# Patient Record
Sex: FEMALE | Race: BLACK OR AFRICAN AMERICAN
[De-identification: names, ages, dates, MRNs, and addresses within clinical notes are randomized per-mention and may not be internally consistent; named-entity substitution may affect disease eponyms.]

---

## 2020-06-03 ENCOUNTER — HOSPITAL ENCOUNTER (OUTPATIENT)
Dept: HOSPITAL 53 - M LAB REF | Age: 35
End: 2020-06-03
Attending: NURSE PRACTITIONER
Payer: COMMERCIAL

## 2020-06-03 DIAGNOSIS — L60.3: Primary | ICD-10-CM

## 2020-06-09 ENCOUNTER — HOSPITAL ENCOUNTER (OUTPATIENT)
Dept: HOSPITAL 53 - M LAB REF | Age: 35
End: 2020-06-09
Attending: NURSE PRACTITIONER
Payer: COMMERCIAL

## 2020-06-09 DIAGNOSIS — L60.3: Primary | ICD-10-CM

## 2020-06-22 ENCOUNTER — HOSPITAL ENCOUNTER (OUTPATIENT)
Dept: HOSPITAL 53 - M RAD | Age: 35
End: 2020-06-22
Attending: NURSE PRACTITIONER
Payer: COMMERCIAL

## 2020-06-22 DIAGNOSIS — E22.1: Primary | ICD-10-CM

## 2020-07-06 ENCOUNTER — HOSPITAL ENCOUNTER (OUTPATIENT)
Dept: HOSPITAL 53 - M RAD | Age: 35
End: 2020-07-06
Attending: NURSE PRACTITIONER
Payer: COMMERCIAL

## 2020-07-06 DIAGNOSIS — E22.1: Primary | ICD-10-CM

## 2020-07-06 DIAGNOSIS — D35.2: ICD-10-CM

## 2020-07-06 PROCEDURE — 70553 MRI BRAIN STEM W/O & W/DYE: CPT

## 2020-07-06 NOTE — REP
MRI BRAIN/PITUITARY GLAND:

 

INDICATION: Hyperprolactinemia.

 

TECHNIQUE: Multiplanar short and long TR sequences of the brain and sella were

performed including IV gadolinium imaging.

 

COMPARISON:  06/29/2018

 

FINDINGS:

The microadenoma within the left pituitary gland is stable.  There are no new

areas of abnormal enhancement within the sella or suprasellar region.  The

pituitary infundibulum remains slightly deviated to the left.  There are no areas

of pathologic gadolinium enhancement within the brain stem or brain parenchyma.

There are no areas of restricted diffusion.  There is no intracranial mass effect

or hydrocephalus.  No areas of abnormal signals are present within the brain stem

or brain parenchyma.  The large intracranial flow void is unremarkable.

 

IMPRESSION:

1.  Stable pituitary microadenoma.

2.  No acute intracranial process.

 

 

 

Unreviewed

## 2021-02-05 ENCOUNTER — HOSPITAL ENCOUNTER (OUTPATIENT)
Dept: HOSPITAL 53 - M LAB | Age: 36
End: 2021-02-05
Attending: DERMATOLOGY
Payer: COMMERCIAL

## 2021-02-05 DIAGNOSIS — L60.3: Primary | ICD-10-CM

## 2021-02-05 LAB
HBV CORE IGM SER QL: NEGATIVE
HBV SURFACE AB SER QL: POSITIVE
HBV SURFACE AB SER-ACNC: NEGATIVE M[IU]/ML
HCV AB SER QL: < 0 INDEX (ref ?–0.8)
HEPATITIS A ANTIBODY IGM: NEGATIVE
RHEUMATOID FACT SERPL-ACNC: < 10 IU/ML (ref ?–15)

## 2021-11-10 ENCOUNTER — OFFICE VISIT (OUTPATIENT)
Dept: UROLOGY | Age: 36
End: 2021-11-10
Payer: OTHER GOVERNMENT

## 2021-11-10 VITALS
BODY MASS INDEX: 30.01 KG/M2 | TEMPERATURE: 97.5 F | HEIGHT: 68 IN | HEART RATE: 78 BPM | OXYGEN SATURATION: 99 % | WEIGHT: 198 LBS | DIASTOLIC BLOOD PRESSURE: 84 MMHG | SYSTOLIC BLOOD PRESSURE: 119 MMHG

## 2021-11-10 DIAGNOSIS — N18.2 CKD (CHRONIC KIDNEY DISEASE) STAGE 2, GFR 60-89 ML/MIN: Primary | ICD-10-CM

## 2021-11-10 DIAGNOSIS — N28.89 PELVIECTASIS: ICD-10-CM

## 2021-11-10 LAB
BILIRUB UR QL STRIP: NEGATIVE
GLUCOSE UR-MCNC: NEGATIVE MG/DL
KETONES P FAST UR STRIP-MCNC: NEGATIVE MG/DL
PH UR STRIP: 7.5 [PH] (ref 4.6–8)
PROT UR QL STRIP: NEGATIVE
SP GR UR STRIP: 1.01 (ref 1–1.03)
UA UROBILINOGEN AMB POC: NORMAL (ref 0.2–1)
URINALYSIS CLARITY POC: CLEAR
URINALYSIS COLOR POC: YELLOW
URINE BLOOD POC: NEGATIVE
URINE LEUKOCYTES POC: NEGATIVE
URINE NITRITES POC: NEGATIVE

## 2021-11-10 PROCEDURE — 99204 OFFICE O/P NEW MOD 45 MIN: CPT | Performed by: UROLOGY

## 2021-11-10 PROCEDURE — 81003 URINALYSIS AUTO W/O SCOPE: CPT | Performed by: UROLOGY

## 2021-11-10 RX ORDER — CLOBETASOL PROPIONATE 0.5 MG/G
OINTMENT TOPICAL 2 TIMES DAILY
COMMUNITY

## 2021-11-10 RX ORDER — KETOCONAZOLE 20 MG/G
CREAM TOPICAL DAILY
COMMUNITY

## 2021-11-10 RX ORDER — BACLOFEN 10 MG/1
TABLET ORAL 3 TIMES DAILY
COMMUNITY
End: 2022-10-05

## 2021-11-10 RX ORDER — DICLOFENAC SODIUM 10 MG/G
GEL TOPICAL 4 TIMES DAILY
COMMUNITY

## 2021-11-10 RX ORDER — KETOCONAZOLE 20 MG/ML
SHAMPOO TOPICAL DAILY PRN
COMMUNITY

## 2021-11-10 RX ORDER — DIPHENHYDRAMINE HCL 25 MG
25 CAPSULE ORAL
COMMUNITY

## 2021-11-10 RX ORDER — TACROLIMUS 1 MG/G
OINTMENT TOPICAL 2 TIMES DAILY
COMMUNITY

## 2021-11-10 RX ORDER — LEVOTHYROXINE SODIUM 88 UG/1
TABLET ORAL
COMMUNITY

## 2021-11-10 RX ORDER — SUMATRIPTAN 100 MG/1
100 TABLET, FILM COATED ORAL
COMMUNITY

## 2021-11-10 RX ORDER — FLUOCINOLONE ACETONIDE 0.11 MG/ML
OIL TOPICAL
COMMUNITY

## 2021-11-10 RX ORDER — TOPIRAMATE 200 MG/1
TABLET ORAL 2 TIMES DAILY
COMMUNITY

## 2021-11-10 RX ORDER — CABERGOLINE 0.5 MG/1
0.5 TABLET ORAL 2 TIMES WEEKLY
COMMUNITY
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
## 2021-11-10 NOTE — PROGRESS NOTES
Chief Complaint   Patient presents with    New Patient     renal dysfunction      1. Have you been to the ER, urgent care clinic since your last visit? Hospitalized since your last visit? No    2. Have you seen or consulted any other health care providers outside of the [address] [place] [address] since your last visit? Include any pap smears or colon screening.  No  Visit Vitals  /84 (BP 1 Location: Left upper arm, BP Patient Position: Sitting, BP Cuff Size: Adult)   Pulse 78   Temp 97.5 °F (36.4 °C) (Temporal)   Ht 5' 8\" (1.727 m)   Wt 198 lb (89.8 kg)   SpO2 99%   BMI 30.11 kg/m²

## 2021-11-10 NOTE — ASSESSMENT & PLAN NOTE
Mild elevation in Cr. She was on a keto diet at the time. I will recheck her labs. She was advised on hydration. She should avoid NSAIDs. She will minimize caffeine intake.

## 2021-11-11 ENCOUNTER — PATIENT MESSAGE (OUTPATIENT)
Dept: UROLOGY | Age: 36
End: 2021-11-11

## 2021-11-11 LAB
BUN SERPL-MCNC: 9 MG/DL (ref 6–20)
BUN/CREAT SERPL: 10 (ref 9–23)
CALCIUM SERPL-MCNC: 9.5 MG/DL (ref 8.7–10.2)
CHLORIDE SERPL-SCNC: 105 MMOL/L (ref 96–106)
CO2 SERPL-SCNC: 23 MMOL/L (ref 20–29)
CREAT SERPL-MCNC: 0.87 MG/DL (ref 0.57–1)
GLUCOSE SERPL-MCNC: 86 MG/DL (ref 65–99)
POTASSIUM SERPL-SCNC: 4.3 MMOL/L (ref 3.5–5.2)
SODIUM SERPL-SCNC: 142 MMOL/L (ref 134–144)

## 2021-11-15 NOTE — TELEPHONE ENCOUNTER
Documentation in Dr. [facility] note was corrected. That is a smartlink so at some point in time the alcohol box was marked as positive. It has been fixed on all ends.

## 2021-11-16 ENCOUNTER — HOSPITAL ENCOUNTER (OUTPATIENT)
Dept: CT IMAGING | Age: 36
Discharge: HOME | End: 2021-11-16
Attending: UROLOGY
Payer: OTHER GOVERNMENT

## 2021-11-16 DIAGNOSIS — N18.2 CKD (CHRONIC KIDNEY DISEASE) STAGE 2, GFR 60-89 ML/MIN: ICD-10-CM

## 2021-11-16 DIAGNOSIS — N28.89 PELVIECTASIS: ICD-10-CM

## 2021-11-16 PROCEDURE — 74011000636 HC RX REV CODE- 636: Performed by: UROLOGY

## 2021-11-16 PROCEDURE — 74178 CT ABD&PLV WO CNTR FLWD CNTR: CPT

## 2021-11-16 RX ADMIN — IOPAMIDOL 100 ML: 755 INJECTION, SOLUTION INTRAVENOUS at 17:35

## 2021-11-17 ENCOUNTER — HOSPITAL ENCOUNTER (EMERGENCY)
Age: 36
Discharge: HOME | End: 2021-11-18
Attending: STUDENT IN AN ORGANIZED HEALTH CARE EDUCATION/TRAINING PROGRAM
Payer: OTHER GOVERNMENT

## 2021-11-17 ENCOUNTER — APPOINTMENT (OUTPATIENT)
Dept: GENERAL RADIOLOGY | Age: 36
End: 2021-11-17
Attending: EMERGENCY MEDICINE
Payer: OTHER GOVERNMENT

## 2021-11-17 DIAGNOSIS — R07.89 ATYPICAL CHEST PAIN: Primary | ICD-10-CM

## 2021-11-17 LAB
BASOPHILS # BLD: 0 K/UL (ref 0–0.1)
BASOPHILS NFR BLD: 0 % (ref 0–1)
DIFFERENTIAL METHOD BLD: ABNORMAL
EOSINOPHIL # BLD: 0.1 K/UL (ref 0–0.4)
EOSINOPHIL NFR BLD: 1 % (ref 0–7)
ERYTHROCYTE [DISTWIDTH] IN BLOOD BY AUTOMATED COUNT: 13.3 % (ref 11.5–14.5)
HCT VFR BLD AUTO: 34.3 % (ref 35–47)
HGB BLD-MCNC: 11 G/DL (ref 11.5–16)
IMM GRANULOCYTES # BLD AUTO: 0 K/UL (ref 0–0.04)
IMM GRANULOCYTES NFR BLD AUTO: 0 % (ref 0–0.5)
LYMPHOCYTES # BLD: 2.1 K/UL (ref 0.8–3.5)
LYMPHOCYTES NFR BLD: 34 % (ref 12–49)
MCH RBC QN AUTO: 27.8 PG (ref 26–34)
MCHC RBC AUTO-ENTMCNC: 32.1 G/DL (ref 30–36.5)
MCV RBC AUTO: 86.6 FL (ref 80–99)
MONOCYTES # BLD: 0.5 K/UL (ref 0–1)
MONOCYTES NFR BLD: 8 % (ref 5–13)
NEUTS SEG # BLD: 3.4 K/UL (ref 1.8–8)
NEUTS SEG NFR BLD: 57 % (ref 32–75)
NRBC # BLD: 0 K/UL (ref 0–0.01)
NRBC BLD-RTO: 0 PER 100 WBC
PLATELET # BLD AUTO: 312 K/UL (ref 150–400)
PMV BLD AUTO: 10.5 FL (ref 8.9–12.9)
RBC # BLD AUTO: 3.96 M/UL (ref 3.8–5.2)
TROPONIN-HIGH SENSITIVITY: 4 NG/L (ref 0–51)
WBC # BLD AUTO: 6.1 K/UL (ref 3.6–11)

## 2021-11-17 PROCEDURE — 84484 ASSAY OF TROPONIN QUANT: CPT

## 2021-11-17 PROCEDURE — 36415 COLL VENOUS BLD VENIPUNCTURE: CPT

## 2021-11-17 PROCEDURE — 93005 ELECTROCARDIOGRAM TRACING: CPT

## 2021-11-17 PROCEDURE — 85025 COMPLETE CBC W/AUTO DIFF WBC: CPT

## 2021-11-17 PROCEDURE — 71045 X-RAY EXAM CHEST 1 VIEW: CPT

## 2021-11-17 PROCEDURE — 84702 CHORIONIC GONADOTROPIN TEST: CPT

## 2021-11-17 PROCEDURE — 80053 COMPREHEN METABOLIC PANEL: CPT

## 2021-11-17 PROCEDURE — 99284 EMERGENCY DEPT VISIT MOD MDM: CPT

## 2021-11-18 VITALS
DIASTOLIC BLOOD PRESSURE: 82 MMHG | WEIGHT: 198 LBS | HEART RATE: 88 BPM | RESPIRATION RATE: 16 BRPM | HEIGHT: 68 IN | TEMPERATURE: 98.3 F | OXYGEN SATURATION: 98 % | SYSTOLIC BLOOD PRESSURE: 114 MMHG | BODY MASS INDEX: 30.01 KG/M2

## 2021-11-18 LAB
ALBUMIN SERPL-MCNC: 3.6 G/DL (ref 3.5–5)
ALBUMIN/GLOB SERPL: 0.8 {RATIO} (ref 1.1–2.2)
ALP SERPL-CCNC: 72 U/L (ref 45–117)
ALT SERPL-CCNC: 15 U/L (ref 12–78)
ANION GAP SERPL CALC-SCNC: 6 MMOL/L (ref 5–15)
AST SERPL W P-5'-P-CCNC: 35 U/L (ref 15–37)
ATRIAL RATE: 100 BPM
BILIRUB SERPL-MCNC: 0.2 MG/DL (ref 0.2–1)
BUN SERPL-MCNC: 15 MG/DL (ref 6–20)
BUN/CREAT SERPL: 17 (ref 12–20)
CA-I BLD-MCNC: 9 MG/DL (ref 8.5–10.1)
CALCULATED P AXIS, ECG09: 64 DEGREES
CALCULATED R AXIS, ECG10: 49 DEGREES
CALCULATED T AXIS, ECG11: 32 DEGREES
CHLORIDE SERPL-SCNC: 108 MMOL/L (ref 97–108)
CO2 SERPL-SCNC: 24 MMOL/L (ref 21–32)
CREAT SERPL-MCNC: 0.9 MG/DL (ref 0.55–1.02)
DIAGNOSIS, 93000: NORMAL
GLOBULIN SER CALC-MCNC: 4.5 G/DL (ref 2–4)
GLUCOSE SERPL-MCNC: 88 MG/DL (ref 65–100)
HCG SERPL-ACNC: <1 MIU/ML (ref 0–6)
P-R INTERVAL, ECG05: 142 MS
POTASSIUM SERPL-SCNC: 5 MMOL/L (ref 3.5–5.1)
PROT SERPL-MCNC: 8.1 G/DL (ref 6.4–8.2)
Q-T INTERVAL, ECG07: 344 MS
QRS DURATION, ECG06: 70 MS
QTC CALCULATION (BEZET), ECG08: 443 MS
SODIUM SERPL-SCNC: 138 MMOL/L (ref 136–145)
VENTRICULAR RATE, ECG03: 100 BPM

## 2021-11-18 PROCEDURE — 74011250637 HC RX REV CODE- 250/637: Performed by: STUDENT IN AN ORGANIZED HEALTH CARE EDUCATION/TRAINING PROGRAM

## 2021-11-18 RX ORDER — METHOCARBAMOL 750 MG/1
1500 TABLET, FILM COATED ORAL ONCE
Status: COMPLETED | OUTPATIENT
Start: 2021-11-18 | End: 2021-11-18

## 2021-11-18 RX ORDER — IBUPROFEN 600 MG/1
600 TABLET ORAL
Qty: 20 TABLET | Refills: 0 | Status: SHIPPED | OUTPATIENT
Start: 2021-11-18

## 2021-11-18 RX ORDER — IBUPROFEN 600 MG/1
600 TABLET ORAL
Status: DISCONTINUED | OUTPATIENT
Start: 2021-11-18 | End: 2021-11-18 | Stop reason: HOSPADM

## 2021-11-18 RX ORDER — METHOCARBAMOL 750 MG/1
750 TABLET, FILM COATED ORAL
Qty: 20 TABLET | Refills: 0 | Status: SHIPPED | OUTPATIENT
Start: 2021-11-18 | End: 2022-10-05 | Stop reason: ALTCHOICE

## 2021-11-18 RX ORDER — ACETAMINOPHEN 325 MG/1
650 TABLET ORAL
Qty: 20 TABLET | Refills: 0 | Status: SHIPPED | OUTPATIENT
Start: 2021-11-18

## 2021-11-18 RX ORDER — FAMOTIDINE 20 MG/1
20 TABLET, FILM COATED ORAL
Status: COMPLETED | OUTPATIENT
Start: 2021-11-18 | End: 2021-11-18

## 2021-11-18 RX ADMIN — FAMOTIDINE 20 MG: 20 TABLET, FILM COATED ORAL at 00:19

## 2021-11-18 RX ADMIN — METHOCARBAMOL 1500 MG: 750 TABLET ORAL at 00:19

## 2021-11-18 NOTE — ED PROVIDER NOTES
EMERGENCY DEPARTMENT HISTORY AND PHYSICAL EXAM      Date: 11/17/2021  Patient Name: Galen Butler    History of Presenting Illness     Chief Complaint   Patient presents with    Chest Pain       HPI: Galen Butler, 39 y.o. female with a past medical history of hyperlipidemia presenting for chest pain and shortness of breath. Patient has had a 1 month history of intermittent chest pains that are described as pressure-like and stabbing-like alternatingly in the mid chest area without radiation. She also has shortness of breath that is intermittently associated with  the chest pain and sometimes occurs at rest. Symptoms are not particularly only worsened by exertion and exertional dyspnea is inconsistent. She denies any fevers, chills, cough, hemoptysis, lower extremity swelling, orthopnea, OCP use, or history of DVT/PE. She has been evaluated by cardiology for the chest pain and tests were negative. Patient has had these symptoms before. She is presented today for nonresolution of symptoms. PCP: None    Current Facility-Administered Medications   Medication Dose Route Frequency Provider Last Rate Last Admin    famotidine (PEPCID) tablet 20 mg  20 mg Oral NOW Luca Blake MD        methocarbamoL (ROBAXIN) tablet 1,500 mg  1,500 mg Oral ONCE Luca Blake MD        ibuprofen (MOTRIN) tablet 600 mg  600 mg Oral NOW Luca Leal MD         Current Outpatient Medications   Medication Sig Dispense Refill    acetaminophen (TYLENOL) 325 mg tablet Take 2 Tablets by mouth every six (6) hours as needed for Pain. 20 Tablet 0    ibuprofen (MOTRIN) 600 mg tablet Take 1 Tablet by mouth three (3) times daily as needed for Pain. 20 Tablet 0    methocarbamoL (Robaxin-750) 750 mg tablet Take 1 Tablet by mouth three (3) times daily as needed for Muscle Spasm(s). 20 Tablet 0    diclofenac (VOLTAREN) 1 % gel Apply  to affected area four (4) times daily.       topiramate (TOPAMAX) 200 mg tablet Take  by mouth two (2) times a day.      levothyroxine (SYNTHROID) 88 mcg tablet Take  by mouth Daily (before breakfast).  fluocinolone 0.01 % external oil Apply  to affected area.  clobetasoL (TEMOVATE) 0.05 % ointment Apply  to affected area two (2) times a day.  diphenhydrAMINE (BENADRYL) 25 mg capsule Take 25 mg by mouth every six (6) hours as needed.  SUMAtriptan (IMITREX) 100 mg tablet Take 100 mg by mouth once as needed for Migraine.  cabergoline (DOSTINEX) 0.5 mg tablet Take 0.5 mg by mouth two (2) times a week.  ketoconazole (NIZORAL) 2 % topical cream Apply  to affected area daily.  ketoconazole (NIZORAL) 2 % shampoo Apply  to affected area daily as needed for Itching.  tacrolimus (PROTOPIC) 0.1 % ointment Apply  to affected area two (2) times a day.  baclofen (LIORESAL) 10 mg tablet Take  by mouth three (3) times daily.  selenium sulfide (TERSI) 2.25 % topical foam Apply  to affected area daily.  onabotulinumtoxinA (BOTOX) 100 unit injection 100 Units by IntraMUSCular route once. She gets it every 3 months in the head and shoulders         Medical History   I reviewed the medical, surgical, family, and social history, as well as allergies:    Past Medical History:  Past Medical History:   Diagnosis Date    Hypercholesterolemia        Past Surgical History:  Past Surgical History:   Procedure Laterality Date    HX ORTHOPAEDIC      left elbow    HX SHOULDER ARTHROSCOPY Right        Family History:  Family History   Problem Relation Age of Onset    Cancer Maternal Grandmother     Cancer Maternal Grandfather        Social History:  Social History     Tobacco Use    Smoking status: Never Smoker    Smokeless tobacco: Never Used   Vaping Use    Vaping Use: Never used   Substance Use Topics    Alcohol use: Not Currently    Drug use: Never       Allergies:   Allergies   Allergen Reactions    Adhesive Unknown (comments)    Egg Unknown (comments)       Review of Systems Review of Systems   All other systems reviewed and are negative. Physical Exam and Vital Signs   Vital Signs - Reviewed the patient's vital signs. Patient Vitals for the past 12 hrs:   Temp Pulse Resp BP SpO2   11/17/21 2056 98.3 °F (36.8 °C) (!) 101 18 118/80 99 %       Physical Exam:    GENERAL: awake, alert, cooperative, not in distress  HEENT:  * Pupils equal, EOMI  * Head atraumatic  CV:  * regular rhythm  * warm and perfused extremities bilaterally  PULMONARY: Good air movement, no wheezes or crackles  ABDOMEN: soft, not distended, no guarding, not tenderness to palpation  : No suprapubic tenderness  EXTREMITIES/BACK: warm and perfused, no tenderness, no edema  SKIN: no rashes or signs of trauma  NEURO:  * Speech clear  * Moves U&LE to command      Medical Decision Making and ED Course   - I am the first and primary provider for this patient and am the primary provider of record. - I reviewed the vital signs, available nursing notes, past medical history, past surgical history, family history and social history. - Initial assessment performed. The patients presenting problems have been discussed, and the staff are in agreement with the care plan formulated and outlined with them. I have encouraged them to ask questions as they arise throughout their visit. - Available medical records, nursing notes, old EKGs, and EMS run sheets (if patient was EMS transported) were reviewed    MDM:   Patient is a 39 y.o. female presenting for chest pain. Vitals reveal a heart rate of 101 and physical exam reveals no abnormalities. EKG showed no abnormalities. Based on the history, physical exam, risk factors, and vitals signs, I favor the following differential diagnoses: ACS, CHF, arrhythmia, valvulopathy, pericarditis, effusion, pneumothorax, pneumonia, GERD, esophageal spasm, musculoskeletal pain. PE is unlikely as the patient has a 0 Wells score, will not pursue.       Results     Labs:  Recent Results (from the past 12 hour(s))   CBC WITH AUTOMATED DIFF    Collection Time: 11/17/21 11:24 PM   Result Value Ref Range    WBC 6.1 3.6 - 11.0 K/uL    RBC 3.96 3.80 - 5.20 M/uL    HGB 11.0 (L) 11.5 - 16.0 g/dL    HCT 34.3 (L) 35.0 - 47.0 %    MCV 86.6 80.0 - 99.0 FL    MCH 27.8 26.0 - 34.0 PG    MCHC 32.1 30.0 - 36.5 g/dL    RDW 13.3 11.5 - 14.5 %    PLATELET 407 435 - 572 K/uL    MPV 10.5 8.9 - 12.9 FL    NRBC 0.0 0.0  WBC    ABSOLUTE NRBC 0.00 0.00 - 0.01 K/uL    NEUTROPHILS 57 32 - 75 %    LYMPHOCYTES 34 12 - 49 %    MONOCYTES 8 5 - 13 %    EOSINOPHILS 1 0 - 7 %    BASOPHILS 0 0 - 1 %    IMMATURE GRANULOCYTES 0 0 - 0.5 %    ABS. NEUTROPHILS 3.4 1.8 - 8.0 K/UL    ABS. LYMPHOCYTES 2.1 0.8 - 3.5 K/UL    ABS. MONOCYTES 0.5 0.0 - 1.0 K/UL    ABS. EOSINOPHILS 0.1 0.0 - 0.4 K/UL    ABS. BASOPHILS 0.0 0.0 - 0.1 K/UL    ABS. IMM. GRANS. 0.0 0.00 - 0.04 K/UL    DF AUTOMATED     METABOLIC PANEL, COMPREHENSIVE    Collection Time: 11/17/21 11:24 PM   Result Value Ref Range    Sodium 138 136 - 145 mmol/L    Potassium 5.0 3.5 - 5.1 mmol/L    Chloride 108 97 - 108 mmol/L    CO2 24 21 - 32 mmol/L    Anion gap 6 5 - 15 mmol/L    Glucose 88 65 - 100 mg/dL    BUN 15 6 - 20 mg/dL    Creatinine 0.90 0.55 - 1.02 mg/dL    BUN/Creatinine ratio 17 12 - 20      GFR est AA >60 >60 ml/min/1.73m2    GFR est non-AA >60 >60 ml/min/1.73m2    Calcium 9.0 8.5 - 10.1 mg/dL    Bilirubin, total 0.2 0.2 - 1.0 mg/dL    AST (SGOT) 35 15 - 37 U/L    ALT (SGPT) 15 12 - 78 U/L    Alk.  phosphatase 72 45 - 117 U/L    Protein, total 8.1 6.4 - 8.2 g/dL    Albumin 3.6 3.5 - 5.0 g/dL    Globulin 4.5 (H) 2.0 - 4.0 g/dL    A-G Ratio 0.8 (L) 1.1 - 2.2     TROPONIN-HIGH SENSITIVITY    Collection Time: 11/17/21 11:24 PM   Result Value Ref Range    Troponin-High Sensitivity 4 0 - 51 ng/L   BETA HCG, QT    Collection Time: 11/17/21 11:24 PM   Result Value Ref Range    Beta HCG, QT <1.0 0 - 6 mIU/mL       Radiologic Studies:  CT Results  (Last 48 hours)    None CXR Results  (Last 48 hours)               11/17/21 2112  XR CHEST PORT Final result    Impression:  No acute cardiopulmonary process. Narrative:  Exam: XR CHEST PORT         Indication:  chest pain/ SOB; Comparison: None       Findings: The cardiomediastinal silhouette is within normal limits. No focal parenchymal   process. No pleural effusion. No pneumothorax. No acute osseous abnormality. Medications ordered:  Medications   famotidine (PEPCID) tablet 20 mg (has no administration in time range)   methocarbamoL (ROBAXIN) tablet 1,500 mg (has no administration in time range)   ibuprofen (MOTRIN) tablet 600 mg (has no administration in time range)        ED Course     ED Course:     ED Course as of 11/18/21 0008   Wed Nov 17, 2021   2307 Chest x-ray negative, no concern for pulmonary edema, pleural effusion, pneumothorax, or pneumonia. [SS]   Thu Nov 18, 2021   0004 Trop is 4, ACS ruled out. [SS]   0004 CBC does not show any evidence of acute process. Leukocytosis not present to suggest infection. Hemoglobin at baseline without evidence of acute anemia. Platelet count is normal. [SS]   0004 Electrolytes are within range. Creatinine is not elevated more than baseline range making NE unlikely. No significant transaminitis noted. Normal bilirubin. BHCG testing rules out pregnancy. [SS]      ED Course User Index  [SS] Lyndsay Balderrama MD       Reassessment / Disposition / Discussion:    Patient has negative work-up. Given the prolonged symptomatology and cardiac evaluation, cardiac causes unlikely. Repeat examination is normal.  EKG is normal.  No concern for ACS. Will discharge with follow-up and return precautions. Final Disposition     Disposition: Condition stable  DC- Adult Discharges: All of the diagnostic tests were reviewed and questions answered. Diagnosis, care plan and treatment options were discussed.   The patient understands the instructions and will follow up as directed. The patients results have been reviewed with them. They have been counseled regarding their diagnosis. The patient verbally convey understanding and agreement of the signs, symptoms, diagnosis, treatment and prognosis and additionally agrees to follow up as recommended with their PCP in 24 - 48 hours. They also agree with the care-plan and convey that all of their questions have been answered. I have also put together some discharge instructions for them that include: 1) educational information regarding their diagnosis, 2) how to care for their diagnosis at home, as well a 3) list of reasons why they would want to return to the ED prior to their follow-up appointment, should their condition change. DISCHARGE PLAN:  1. Current Discharge Medication List      START taking these medications    Details   acetaminophen (TYLENOL) 325 mg tablet Take 2 Tablets by mouth every six (6) hours as needed for Pain. Qty: 20 Tablet, Refills: 0      ibuprofen (MOTRIN) 600 mg tablet Take 1 Tablet by mouth three (3) times daily as needed for Pain. Qty: 20 Tablet, Refills: 0      methocarbamoL (Robaxin-750) 750 mg tablet Take 1 Tablet by mouth three (3) times daily as needed for Muscle Spasm(s). Qty: 20 Tablet, Refills: 0         CONTINUE these medications which have NOT CHANGED    Details   diclofenac (VOLTAREN) 1 % gel Apply  to affected area four (4) times daily. topiramate (TOPAMAX) 200 mg tablet Take  by mouth two (2) times a day. levothyroxine (SYNTHROID) 88 mcg tablet Take  by mouth Daily (before breakfast). fluocinolone 0.01 % external oil Apply  to affected area. clobetasoL (TEMOVATE) 0.05 % ointment Apply  to affected area two (2) times a day. diphenhydrAMINE (BENADRYL) 25 mg capsule Take 25 mg by mouth every six (6) hours as needed. SUMAtriptan (IMITREX) 100 mg tablet Take 100 mg by mouth once as needed for Migraine.       cabergoline (DOSTINEX) 0.5 mg tablet Take 0.5 mg by mouth two (2) times a week.      ketoconazole (NIZORAL) 2 % topical cream Apply  to affected area daily. ketoconazole (NIZORAL) 2 % shampoo Apply  to affected area daily as needed for Itching. tacrolimus (PROTOPIC) 0.1 % ointment Apply  to affected area two (2) times a day. baclofen (LIORESAL) 10 mg tablet Take  by mouth three (3) times daily. selenium sulfide (TERSI) 2.25 % topical foam Apply  to affected area daily. onabotulinumtoxinA (BOTOX) 100 unit injection 100 Units by IntraMUSCular route once. She gets it every 3 months in the head and shoulders           2. Follow-up Information     Follow up With Specialties Details Why [address] EMERGENCY DEPT Emergency Medicine Go to  If symptoms worsen [address] [facility] [address] [phone]    Your doctor  Schedule an appointment as soon as possible for a visit in 3 days          3. Return to ED if worse   4. Current Discharge Medication List      START taking these medications    Details   acetaminophen (TYLENOL) 325 mg tablet Take 2 Tablets by mouth every six (6) hours as needed for Pain. Qty: 20 Tablet, Refills: 0  Start date: 11/18/2021      ibuprofen (MOTRIN) 600 mg tablet Take 1 Tablet by mouth three (3) times daily as needed for Pain. Qty: 20 Tablet, Refills: 0  Start date: 11/18/2021      methocarbamoL (Robaxin-750) 750 mg tablet Take 1 Tablet by mouth three (3) times daily as needed for Muscle Spasm(s). Qty: 20 Tablet, Refills: 0  Start date: 11/18/2021             ED Procedures & Consultations   Performed by: Rani Cabrera MD  Procedures     EKG interpretation (Preliminary):  Rhythm: normal sinus rhythm; and tachycardic . Rate (approx.): 100. Axis: normal;  MS interval: normal;  QRS interval: normal ;  ST/T wave: normal;  Other findings: abnormal EKG: Sinus tachycardia. Diagnosis     Clinical Impression:   1.  Atypical chest pain        Attestations:    Rani Cabrera MD    Please note that this dictation was completed with [employer], the computer voice recognition software. Quite often unanticipated grammatical, syntax, homophones, and other interpretive errors are inadvertently transcribed by the computer software. Please disregard these errors. Please excuse any errors that have escaped final proofreading. Thank you.

## 2021-11-18 NOTE — DISCHARGE INSTRUCTIONS
Thank you! Thank you for allowing me to care for you in the emergency department. I sincerely hope that you are satisfied with your visit today. It is my goal to provide you with excellent care. Below you will find a list of your labs and imaging from your visit today. Should you have any questions regarding these results please do not hesitate to call the emergency department. Labs -     Recent Results (from the past 12 hour(s))   CBC WITH AUTOMATED DIFF    Collection Time: 11/17/21 11:24 PM   Result Value Ref Range    WBC 6.1 3.6 - 11.0 K/uL    RBC 3.96 3.80 - 5.20 M/uL    HGB 11.0 (L) 11.5 - 16.0 g/dL    HCT 34.3 (L) 35.0 - 47.0 %    MCV 86.6 80.0 - 99.0 FL    MCH 27.8 26.0 - 34.0 PG    MCHC 32.1 30.0 - 36.5 g/dL    RDW 13.3 11.5 - 14.5 %    PLATELET 151 711 - 115 K/uL    MPV 10.5 8.9 - 12.9 FL    NRBC 0.0 0.0  WBC    ABSOLUTE NRBC 0.00 0.00 - 0.01 K/uL    NEUTROPHILS 57 32 - 75 %    LYMPHOCYTES 34 12 - 49 %    MONOCYTES 8 5 - 13 %    EOSINOPHILS 1 0 - 7 %    BASOPHILS 0 0 - 1 %    IMMATURE GRANULOCYTES 0 0 - 0.5 %    ABS. NEUTROPHILS 3.4 1.8 - 8.0 K/UL    ABS. LYMPHOCYTES 2.1 0.8 - 3.5 K/UL    ABS. MONOCYTES 0.5 0.0 - 1.0 K/UL    ABS. EOSINOPHILS 0.1 0.0 - 0.4 K/UL    ABS. BASOPHILS 0.0 0.0 - 0.1 K/UL    ABS. IMM. GRANS. 0.0 0.00 - 0.04 K/UL    DF AUTOMATED     METABOLIC PANEL, COMPREHENSIVE    Collection Time: 11/17/21 11:24 PM   Result Value Ref Range    Sodium 138 136 - 145 mmol/L    Potassium 5.0 3.5 - 5.1 mmol/L    Chloride 108 97 - 108 mmol/L    CO2 24 21 - 32 mmol/L    Anion gap 6 5 - 15 mmol/L    Glucose 88 65 - 100 mg/dL    BUN 15 6 - 20 mg/dL    Creatinine 0.90 0.55 - 1.02 mg/dL    BUN/Creatinine ratio 17 12 - 20      GFR est AA >60 >60 ml/min/1.73m2    GFR est non-AA >60 >60 ml/min/1.73m2    Calcium 9.0 8.5 - 10.1 mg/dL    Bilirubin, total 0.2 0.2 - 1.0 mg/dL    AST (SGOT) 35 15 - 37 U/L    ALT (SGPT) 15 12 - 78 U/L    Alk.  phosphatase 72 45 - 117 U/L    Protein, total 8.1 6.4 - 8.2 g/dL    Albumin 3.6 3.5 - 5.0 g/dL    Globulin 4.5 (H) 2.0 - 4.0 g/dL    A-G Ratio 0.8 (L) 1.1 - 2.2     TROPONIN-HIGH SENSITIVITY    Collection Time: 11/17/21 11:24 PM   Result Value Ref Range    Troponin-High Sensitivity 4 0 - 51 ng/L   BETA HCG, QT    Collection Time: 11/17/21 11:24 PM   Result Value Ref Range    Beta HCG, QT <1.0 0 - 6 mIU/mL       Radiologic Studies -   XR CHEST PORT   Final Result   No acute cardiopulmonary process. CT Results  (Last 48 hours)      None          CXR Results  (Last 48 hours)                 11/17/21 2112  XR CHEST PORT Final result    Impression:  No acute cardiopulmonary process. Narrative:  Exam: XR CHEST PORT         Indication:  chest pain/ SOB; Comparison: None       Findings: The cardiomediastinal silhouette is within normal limits. No focal parenchymal   process. No pleural effusion. No pneumothorax. No acute osseous abnormality. If you feel that you have not received excellent quality care or timely care, please ask to speak to the nurse manager. Please choose us in the future for your continued health care needs. ------------------------------------------------------------------------------------------------------------  The exam and treatment you received in the Emergency Department were for an urgent problem and are not intended as complete care. It is important that you follow-up with a doctor, nurse practitioner, or physician assistant to:  (1) confirm your diagnosis,  (2) re-evaluation of changes in your illness and treatment, and  (3) for ongoing care. If your symptoms become worse or you do not improve as expected and you are unable to reach your usual health care provider, you should return to the Emergency Department. We are available 24 hours a day. Please take your discharge instructions with you when you go to your follow-up appointment.      If you have any problem arranging a follow-up appointment, contact the Emergency Department immediately. If a prescription has been provided, please have it filled as soon as possible to prevent a delay in treatment. Read the entire medication instruction sheet provided to you by the pharmacy. If you have any questions or reservations about taking the medication due to side effects or interactions with other medications, please call your primary care physician or contact the ER to speak with the charge nurse. Make an appointment with your family doctor or the physician you were referred to for follow-up of this visit as instructed on your discharge paperwork, as this is a mandatory follow-up. Return to the ER if you are unable to be seen or if you are unable to be seen in a timely manner. If you have any problem arranging the follow-up visit, contact the Emergency Department immediately.

## 2021-11-18 NOTE — ED TRIAGE NOTES
Patient states she started with chest pain and SOB 3 days ago; denies any known injuries; denies coughing

## 2021-11-19 DIAGNOSIS — R93.429 ABNORMAL CT SCAN, KIDNEY: Primary | ICD-10-CM

## 2021-11-19 NOTE — PROGRESS NOTES
Cookie Cota is calling because she has a tooth infection and has been on antibiotic for 2 days now.  She wants to know should she still come in today while on this antibiotic on post pone please advise Dilation of the renal pelvis. Discussed result with patient. She elected to proceed to NM renal scan to help quantify the problem. Order placed.

## 2021-11-26 ENCOUNTER — HOSPITAL ENCOUNTER (OUTPATIENT)
Dept: NUCLEAR MEDICINE | Age: 36
Discharge: HOME | End: 2021-11-26
Attending: NURSE PRACTITIONER
Payer: OTHER GOVERNMENT

## 2021-11-26 DIAGNOSIS — R93.429 ABNORMAL CT SCAN, KIDNEY: ICD-10-CM

## 2021-11-26 PROCEDURE — 78708 K FLOW/FUNCT IMAGE W/DRUG: CPT

## 2021-11-26 PROCEDURE — 74011250636 HC RX REV CODE- 250/636

## 2021-11-26 RX ORDER — FUROSEMIDE 10 MG/ML
INJECTION INTRAMUSCULAR; INTRAVENOUS
Status: COMPLETED
Start: 2021-11-26 | End: 2021-11-26

## 2021-11-26 RX ORDER — BETIATIDE 1 MG/1
10 INJECTION, POWDER, LYOPHILIZED, FOR SOLUTION INTRAVENOUS
Status: COMPLETED | OUTPATIENT
Start: 2021-11-26 | End: 2021-11-26

## 2021-11-26 RX ADMIN — FUROSEMIDE 40 MG: 10 INJECTION INTRAMUSCULAR; INTRAVENOUS at 09:00

## 2021-11-26 RX ADMIN — BETIATIDE 11 MILLICURIE: 1 INJECTION, POWDER, LYOPHILIZED, FOR SOLUTION INTRAVENOUS at 08:00

## 2022-03-19 PROBLEM — N28.89 PELVIECTASIS: Status: ACTIVE | Noted: 2021-11-10

## 2022-03-20 PROBLEM — N18.2 CKD (CHRONIC KIDNEY DISEASE) STAGE 2, GFR 60-89 ML/MIN: Status: ACTIVE | Noted: 2021-11-10

## 2022-10-05 ENCOUNTER — OFFICE VISIT (OUTPATIENT)
Dept: NEUROLOGY | Age: 37
End: 2022-10-05
Payer: OTHER GOVERNMENT

## 2022-10-05 VITALS — DIASTOLIC BLOOD PRESSURE: 64 MMHG | HEART RATE: 63 BPM | OXYGEN SATURATION: 98 % | SYSTOLIC BLOOD PRESSURE: 106 MMHG

## 2022-10-05 DIAGNOSIS — G43.109 CHRONIC MIGRAINE WITH AURA: Primary | ICD-10-CM

## 2022-10-05 PROCEDURE — 99245 OFF/OP CONSLTJ NEW/EST HI 55: CPT | Performed by: NURSE PRACTITIONER

## 2022-10-05 RX ORDER — RIMEGEPANT SULFATE 75 MG/75MG
TABLET, ORALLY DISINTEGRATING ORAL
Qty: 8 TABLET | Refills: 5 | Status: SHIPPED | OUTPATIENT
Start: 2022-10-05 | End: 2022-10-10 | Stop reason: SDUPTHER

## 2022-10-05 RX ORDER — ERENUMAB-AOOE 140 MG/ML
140 INJECTION, SOLUTION SUBCUTANEOUS
Qty: 1 EACH | Refills: 5 | Status: SHIPPED | OUTPATIENT
Start: 2022-10-05 | End: 2022-10-10 | Stop reason: SDUPTHER

## 2022-10-05 RX ORDER — ONABOTULINUMTOXINA 200 [USP'U]/1
INJECTION, POWDER, LYOPHILIZED, FOR SOLUTION INTRADERMAL; INTRAMUSCULAR
Qty: 200 UNITS | Refills: 3 | Status: SHIPPED | OUTPATIENT
Start: 2022-10-05

## 2022-10-05 RX ORDER — PRENATAL VIT 96/IRON FUM/FOLIC 27MG-0.8MG
TABLET ORAL
COMMUNITY

## 2022-10-05 NOTE — PROGRESS NOTES
Switching neurologists, Dr. Damaso Mg  Topamax, sumatriptan nasal and tablet, and botox, last procedure was around June  With this remedy having 15+ monthly  Mainly in sinus area   Could have blurred vision   Has had a recent brain scan  Val [employer]ton works but uses it rarely, will use [facility] in its place

## 2022-10-06 ENCOUNTER — TELEPHONE (OUTPATIENT)
Dept: NEUROLOGY | Age: 37
End: 2022-10-06

## 2022-10-06 NOTE — PROGRESS NOTES
Daisy Padilla is a 39 y.o. female who presents with the following  No chief complaint on file. HPI      New patient for chronic migraine  She is a transition of care from [facility] with Dr. Ivette Quinn  She has been managing her chronic migraines with Botox  She is also on Topamax  She uses Imitrex as needed but it does not make her feel good  She has used some over-the-counter medications to  She has failed Maxalt in the past also  She has had migraines for years  She did have a recent MRI of the brain and was told she has a pituitary tumor and some fluid on the brain  We will try to grab this MRI to evaluate    Her migraines are chronic she has about 25 in a month  They last anywhere from 4 hours or more  She is unsure of triggers  May be the weather or heat or cold  She does take Topamax 200 mg twice daily  She has been on multiple SSRIs in the past  She is unable to take her blood pressure medication due to hypotension  She has never used any of the CGRP injectables next line she states with her migraines they are located in the right thigh and on the right side of the head  She has nausea dizziness and sensitivity to light sound and smell  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 erenumab-aooe (Aimovig Autoinjector) 140 mg/mL injection 1 mL by SubCUTAneous route every thirty (30) days. rimegepant (Nurtec ODT) 75 mg disintegrating tablet 1 tablet at HA onset PRN. Max 1 tablet in 24 hours. acetaminophen (TYLENOL) 325 mg tablet Take 2 Tablets by mouth every six (6) hours as needed for Pain.     ibuprofen (MOTRIN) 600 mg tablet Take 1 Tablet by mouth three (3) times daily as needed for Pain. diclofenac (VOLTAREN) 1 % gel Apply  to affected area four (4) times daily. topiramate (TOPAMAX) 200 mg tablet Take  by mouth two (2) times a day. levothyroxine (SYNTHROID) 88 mcg tablet Take  by mouth Daily (before breakfast). fluocinolone 0.01 % external oil Apply  to affected area. clobetasoL (TEMOVATE) 0.05 % ointment Apply  to affected area two (2) times a day. diphenhydrAMINE (BENADRYL) 25 mg capsule Take 25 mg by mouth every six (6) hours as needed. SUMAtriptan (IMITREX) 100 mg tablet Take 100 mg by mouth once as needed for Migraine. cabergoline (DOSTINEX) 0.5 mg tablet Take 0.5 mg by mouth two (2) times a week.    ketoconazole (NIZORAL) 2 % topical cream Apply  to affected area daily. ketoconazole (NIZORAL) 2 % shampoo Apply  to affected area daily as needed for Itching. tacrolimus (PROTOPIC) 0.1 % ointment Apply  to affected area two (2) times a day. selenium sulfide (TERSI) 2.25 % topical foam Apply  to affected area daily. prenatal GEEX28/CHRL fum/folic (prenatal vitamin) 27 mg iron- 800 mcg tab tablet prenatal vits 96-ferrous fumarate 27 mg iron-folic acid 740 mcg tablet     No current facility-administered medications for this visit.        Social History     Tobacco Use   Smoking Status Never   Smokeless Tobacco Never       Past Medical History:   Diagnosis Date    Hypercholesterolemia        Past Surgical History:   Procedure Laterality Date    HX ORTHOPAEDIC      left elbow    HX SHOULDER ARTHROSCOPY Right        Family History   Problem Relation Age of Onset    Cancer Maternal Grandmother     Cancer Maternal Grandfather        Social History     Socioeconomic History    Marital status: SINGLE   Tobacco Use    Smoking status: Never    Smokeless tobacco: Never   Vaping Use    Vaping Use: Never used   Substance and Sexual Activity    Alcohol use: Not Currently    Drug use: Never       Review of Systems   Eyes:  Positive for blurred vision, double vision and photophobia. Cardiovascular:  Negative for chest pain and palpitations. Gastrointestinal:  Positive for nausea. Negative for vomiting. Neurological:  Positive for dizziness and headaches. Negative for seizures, loss of consciousness and weakness. Remainder of comprehensive review is negative. Physical Exam :    Visit Vitals  /64 (BP 1 Location: Left upper arm, BP Patient Position: Sitting)   Pulse 63   SpO2 98%       General: Well defined, nourished, and groomed individual in no acute distress. Neck: Supple, nontender, no bruits, no pain with resistance to active range of motion. Musculoskeletal: Extremities revealed no edema and had full range of motion of joints. Psych: Good mood and bright affect    NEUROLOGICAL EXAMINATION:    Mental Status: Alert and oriented to person, place, and time    Cranial Nerves:    II, III, IV, VI: Visual acuity grossly intact. Visual fields are normal.    Pupils are equal, round, and reactive to light and accommodation. Extra-ocular movements are full and fluid. Fundoscopic exam was benign, no ptosis or nystagmus. V-XII: Hearing is grossly intact. Facial features are symmetric, with normal sensation and strength. The palate rises symmetrically and the tongue protrudes midline. Sternocleidomastoids 5/5. Motor Examination: Normal tone, bulk, and strength, 5/5 muscle strength throughout. Coordination: Finger to nose was normal. No resting or intention tremor    Gait and Station: Steady while walking. Normal arm swing. No pronator drift. No muscle wasting or fasiculations noted. Reflexes: DTRs 2+ throughout.           Results for orders placed or performed during the hospital encounter of 11/17/21   CBC WITH AUTOMATED DIFF   Result Value Ref Range    WBC 6.1 3.6 - 11.0 K/uL    RBC 3.96 3.80 - 5.20 M/uL    HGB 11.0 (L) 11.5 - 16.0 g/dL    HCT 34.3 (L) 35.0 - 47.0 %    MCV 86.6 80.0 - 99.0 FL    MCH 27.8 26.0 - 34.0 PG MCHC 32.1 30.0 - 36.5 g/dL    RDW 13.3 11.5 - 14.5 %    PLATELET 204 744 - 800 K/uL    MPV 10.5 8.9 - 12.9 FL    NRBC 0.0 0.0  WBC    ABSOLUTE NRBC 0.00 0.00 - 0.01 K/uL    NEUTROPHILS 57 32 - 75 %    LYMPHOCYTES 34 12 - 49 %    MONOCYTES 8 5 - 13 %    EOSINOPHILS 1 0 - 7 %    BASOPHILS 0 0 - 1 %    IMMATURE GRANULOCYTES 0 0 - 0.5 %    ABS. NEUTROPHILS 3.4 1.8 - 8.0 K/UL    ABS. LYMPHOCYTES 2.1 0.8 - 3.5 K/UL    ABS. MONOCYTES 0.5 0.0 - 1.0 K/UL    ABS. EOSINOPHILS 0.1 0.0 - 0.4 K/UL    ABS. BASOPHILS 0.0 0.0 - 0.1 K/UL    ABS. IMM. GRANS. 0.0 0.00 - 0.04 K/UL    DF AUTOMATED     METABOLIC PANEL, COMPREHENSIVE   Result Value Ref Range    Sodium 138 136 - 145 mmol/L    Potassium 5.0 3.5 - 5.1 mmol/L    Chloride 108 97 - 108 mmol/L    CO2 24 21 - 32 mmol/L    Anion gap 6 5 - 15 mmol/L    Glucose 88 65 - 100 mg/dL    BUN 15 6 - 20 mg/dL    Creatinine 0.90 0.55 - 1.02 mg/dL    BUN/Creatinine ratio 17 12 - 20      GFR est AA >60 >60 ml/min/1.73m2    GFR est non-AA >60 >60 ml/min/1.73m2    Calcium 9.0 8.5 - 10.1 mg/dL    Bilirubin, total 0.2 0.2 - 1.0 mg/dL    AST (SGOT) 35 15 - 37 U/L    ALT (SGPT) 15 12 - 78 U/L    Alk.  phosphatase 72 45 - 117 U/L    Protein, total 8.1 6.4 - 8.2 g/dL    Albumin 3.6 3.5 - 5.0 g/dL    Globulin 4.5 (H) 2.0 - 4.0 g/dL    A-G Ratio 0.8 (L) 1.1 - 2.2     TROPONIN-HIGH SENSITIVITY   Result Value Ref Range    Troponin-High Sensitivity 4 0 - 51 ng/L   BETA HCG, QT   Result Value Ref Range    Beta HCG, QT <1.0 0 - 6 mIU/mL   EKG, 12 LEAD, INITIAL   Result Value Ref Range    Ventricular Rate 100 BPM    Atrial Rate 100 BPM    P-R Interval 142 ms    QRS Duration 70 ms    Q-T Interval 344 ms    QTC Calculation (Bezet) 443 ms    Calculated P Axis 64 degrees    Calculated R Axis 49 degrees    Calculated T Axis 32 degrees    Diagnosis       Normal sinus rhythm  Normal ECG  No previous ECGs available  Confirmed by Linda Stokes MD, Sudhakar ([id number]) on 11/18/2021 5:55:29 PM         Orders Placed This Encounter    REFERRAL TO NEUROLOGY     Referral Priority:   Routine     Referral Type:   Consultation     Referral Reason:   Specialty Services Required     Referred to Provider:   Ashok Avilez NP     Number of Visits Requested:   1    prenatal BVPQ35/RVRD fum/folic (prenatal vitamin) 27 mg iron- 800 mcg tab tablet     Sig: prenatal vits 96-ferrous fumarate 27 mg iron-folic acid 887 mcg tablet    onabotulinumtoxinA (Botox) 200 unit injection     Sig: Inject 155 units into 31 FDA approved sites in head, face, neck, every 3 months for chronic migraine. Dispense:  200 Units     Refill:  3    erenumab-aooe (Aimovig Autoinjector) 140 mg/mL injection     Si mL by SubCUTAneous route every thirty (30) days. Dispense:  1 Each     Refill:  5    rimegepant (Nurtec ODT) 75 mg disintegrating tablet     Si tablet at HA onset PRN. Max 1 tablet in 24 hours. Dispense:  8 Tablet     Refill:  5       1.  Chronic migraine with aura      Transition of care from [facility]  Chronic migraine we will treat with Botox every 3 months  This is a continuation of care and she is actually due for Botox within the next couple weeks  We will get the PA and hopefully get things started shortly    She is never used any of the CGRP medications for prevention  We discussed and we will try Aimovig 140 every 30 days as a different mechanism of action to help with prevention of migraine    She will use Nurtec as needed for headache rescue  She has not really been able to find anything that works that she is able to tolerate  We will get all records from [facility]  She did have an MRI recently at [facility] which we will get information from she was told she has a pituitary tumor and some fluid on the brain  We will definitely want to get this MRI to evaluate closer              This note will not be viewable in [employer]

## 2022-10-10 ENCOUNTER — TELEPHONE (OUTPATIENT)
Dept: NEUROLOGY | Age: 37
End: 2022-10-10

## 2022-10-10 RX ORDER — RIMEGEPANT SULFATE 75 MG/75MG
TABLET, ORALLY DISINTEGRATING ORAL
Qty: 8 TABLET | Refills: 5 | Status: SHIPPED | OUTPATIENT
Start: 2022-10-10

## 2022-10-10 RX ORDER — ERENUMAB-AOOE 140 MG/ML
140 INJECTION, SOLUTION SUBCUTANEOUS
Qty: 1 EACH | Refills: 5 | Status: SHIPPED | OUTPATIENT
Start: 2022-10-10

## 2022-10-11 ENCOUNTER — TELEPHONE (OUTPATIENT)
Dept: NEUROLOGY | Age: 37
End: 2022-10-11

## 2022-10-14 NOTE — TELEPHONE ENCOUNTER
Re Botox: Follow up-saw that PA is approved.   Auth#: [id number]  Effective:10/26/2022-4/23/2023    SSP: [facility], sent update to nurse

## 2022-11-08 RX ORDER — ONABOTULINUMTOXINA 200 [USP'U]/1
INJECTION, POWDER, LYOPHILIZED, FOR SOLUTION INTRADERMAL; INTRAMUSCULAR
Qty: 200 UNITS | Refills: 3 | Status: SHIPPED | OUTPATIENT
Start: 2022-11-08

## 2022-11-21 ENCOUNTER — TELEPHONE (OUTPATIENT)
Dept: NEUROLOGY | Age: 37
End: 2022-11-21

## 2022-11-21 NOTE — TELEPHONE ENCOUNTER
Patient is requesting someone to make her Botox appt and then call [facility] for shipping of the Botox    Please advise.

## 2022-11-30 ENCOUNTER — TELEPHONE (OUTPATIENT)
Dept: NEUROLOGY | Age: 37
End: 2022-11-30

## 2022-11-30 NOTE — TELEPHONE ENCOUNTER
Patient requesting a call to discuss her request for PA for Aimovig. She stated it's been two months waiting for an answer.

## 2022-12-01 ENCOUNTER — DOCUMENTATION ONLY (OUTPATIENT)
Dept: NEUROLOGY | Age: 37
End: 2022-12-01

## 2022-12-01 NOTE — PROGRESS NOTES
PA submitted via [employer]   (Key: [id number]) - [id number]   APPROVED    [id number];JKIRAM:[id number]; Review Type:Prior Auth; Coverage Start Date:11/01/2022; Coverage End Date:05/30/2023;

## 2022-12-01 NOTE — PROGRESS NOTES
PA submitted via Cover[employer]eds   (Key: [id number])  OUTCOME N/A    An active PA is already on file with expiration date of 04/15/2023.  Please wait to resubmit request within 60 days of that expiration date to obtain a PA renewal.

## 2022-12-12 ENCOUNTER — OFFICE VISIT (OUTPATIENT)
Dept: NEUROLOGY | Age: 37
End: 2022-12-12
Payer: OTHER GOVERNMENT

## 2022-12-12 DIAGNOSIS — G43.109 CHRONIC MIGRAINE WITH AURA: Primary | ICD-10-CM

## 2022-12-12 DIAGNOSIS — G93.2 IIH (IDIOPATHIC INTRACRANIAL HYPERTENSION): ICD-10-CM

## 2022-12-12 PROCEDURE — 64615 CHEMODENERV MUSC MIGRAINE: CPT | Performed by: NURSE PRACTITIONER

## 2022-12-12 RX ORDER — ACETAZOLAMIDE 500 MG/1
500 CAPSULE, EXTENDED RELEASE ORAL 2 TIMES DAILY
Qty: 60 CAPSULE | Refills: 5 | Status: SHIPPED | OUTPATIENT
Start: 2022-12-12

## 2022-12-12 NOTE — PROGRESS NOTES
[facility]  OFFICE PROCEDURE PROGRESS NOTE        Chart reviewed for the following:   I, [de-identified] M VIKTOR Gallagher, have reviewed the History, Physical and updated the Allergic reactions for [place] performed immediately prior to start of procedure:   I, [de-identified] M VIKTOR Gallagher, have performed the following reviews on [place] prior to the start of the procedure:            * Patient was identified by name and date of birth   * Agreement on procedure being performed was verified  * Risks and Benefits explained to the patient  * Procedure site verified and marked as necessary  * Patient was positioned for comfort  * Consent was signed and verified     Time: 0900      Date of procedure: 12/12/2022    Procedure performed by:  Lisset Molina NP    Provider assisted by: None    Patient assisted by: None    How tolerated by patient: tolerated the procedure well with no complications    Post Procedural Pain Scale: 2 - Hurts Little Bit    Comments: None    Botox Injection Note       Indication: patient has chronic recurrent migraine, has greater than 15 migraine headaches per month, has failed two or more categories of preventatives    Procedure:   Botox concentration: 200 units in 4 ml of preservative-free normal saline. 31 sites injections, distribution as follow      Units/site  Sites Sides Subtotal    Procerus 5 1 1 5    5 1 2 10   Frontalis 5 2 2 20   Temporalis 5 4 2 40   Occipitalis 5 3 2 30   Upper cervical paraspinalis 5 2 2 20   Trapezius 5 3 2 30         200 units Botox were reconstituted, 155 units injected as above and the remainder was unavoidably wasted.      Patient tolerated procedure well.     ________________________  Jimy Barakat

## 2022-12-16 ENCOUNTER — DOCUMENTATION ONLY (OUTPATIENT)
Dept: NEUROLOGY | Age: 37
End: 2022-12-16

## 2023-02-15 ENCOUNTER — OFFICE VISIT (OUTPATIENT)
Dept: NEUROLOGY | Age: 38
End: 2023-02-15
Payer: OTHER GOVERNMENT

## 2023-02-15 VITALS — HEART RATE: 76 BPM | DIASTOLIC BLOOD PRESSURE: 76 MMHG | OXYGEN SATURATION: 98 % | SYSTOLIC BLOOD PRESSURE: 102 MMHG

## 2023-02-15 DIAGNOSIS — G43.109 CHRONIC MIGRAINE WITH AURA: Primary | ICD-10-CM

## 2023-02-15 PROCEDURE — 99214 OFFICE O/P EST MOD 30 MIN: CPT | Performed by: NURSE PRACTITIONER

## 2023-02-15 NOTE — PROGRESS NOTES
Madeline Downing is a 40 y.o. female who presents with the following  Chief Complaint   Patient presents with    Follow-up       HPI    Follow-up for reinitiation of Botox  This was our first 1 through our clinic and she has noticed a big drop  From 27 a month to about 10 now  She has not had any bad side effects  She does use over-the-counter as a first-line  She does have Nurtec if needed  She does not like the triptans as they cause some side effects  The headaches have not changed at all  Other than dropping them down to about a third or what they were  They obviously also do not last as long they used to last about 6 hours but now not as much  She had a little bit of confusion about the [place] as a preventative and will start this up today she does have this at home as the 140 mg dose  She will do this every 30 days to see if this can help over the next couple months  Should drop her down from 10 to even more but overall she does feel a big difference  Work has been going well she has been very busy and on and off the computer  She did get some time off for the holidays and enjoyed this not doing much      Allergies   Allergen Reactions    Adhesive Unknown (comments)    Egg Unknown (comments)       Current Outpatient Medications   Medication Sig    acetaZOLAMIDE SR (DIAMOX) 500 mg capsule Take 1 Capsule by mouth two (2) times a day. onabotulinumtoxinA (Botox) 200 unit injection Inject 155 units into 31 FDA approved sites in head, face, neck, every 3 months for chronic migraine. erenumab-aooe (Aimovig Autoinjector) 140 mg/mL injection 1 mL by SubCUTAneous route every thirty (30) days. rimegepant (Nurtec ODT) 75 mg disintegrating tablet 1 tablet at HA onset PRN. Max 1 tablet in 24 hours.     prenatal [id number] fum/folic (prenatal vitamin) 27 mg iron- 800 mcg tab tablet prenatal vits 96-ferrous fumarate 27 mg iron-folic acid 228 mcg tablet    acetaminophen (TYLENOL) 325 mg tablet Take 2 Tablets by mouth every six (6) hours as needed for Pain. ibuprofen (MOTRIN) 600 mg tablet Take 1 Tablet by mouth three (3) times daily as needed for Pain. diclofenac (VOLTAREN) 1 % gel Apply  to affected area four (4) times daily. levothyroxine (SYNTHROID) 88 mcg tablet Take  by mouth Daily (before breakfast). fluocinolone 0.01 % external oil Apply  to affected area. clobetasoL (TEMOVATE) 0.05 % ointment Apply  to affected area two (2) times a day. diphenhydrAMINE (BENADRYL) 25 mg capsule Take 25 mg by mouth every six (6) hours as needed. SUMAtriptan (IMITREX) 100 mg tablet Take 100 mg by mouth once as needed for Migraine. cabergoline (DOSTINEX) 0.5 mg tablet Take 0.5 mg by mouth two (2) times a week.    ketoconazole (NIZORAL) 2 % topical cream Apply  to affected area daily. ketoconazole (NIZORAL) 2 % shampoo Apply  to affected area daily as needed for Itching. tacrolimus (PROTOPIC) 0.1 % ointment Apply  to affected area two (2) times a day. selenium sulfide (TERSI) 2.25 % topical foam Apply  to affected area daily. No current facility-administered medications for this visit. Social History     Tobacco Use   Smoking Status Never   Smokeless Tobacco Never       Past Medical History:   Diagnosis Date    Hypercholesterolemia        Past Surgical History:   Procedure Laterality Date    HX ORTHOPAEDIC      left elbow    HX SHOULDER ARTHROSCOPY Right        Family History   Problem Relation Age of Onset    Cancer Maternal Grandmother     Cancer Maternal Grandfather        Social History     Socioeconomic History    Marital status: SINGLE   Tobacco Use    Smoking status: Never    Smokeless tobacco: Never   Vaping Use    Vaping Use: Never used   Substance and Sexual Activity    Alcohol use: Not Currently    Drug use: Never       Review of Systems   Eyes:  Positive for blurred vision and photophobia. Negative for double vision. Respiratory:  Negative for shortness of breath and wheezing. Gastrointestinal:  Negative for nausea and vomiting. Neurological:  Positive for headaches. Negative for dizziness, tingling, tremors, sensory change and speech change. Remainder of comprehensive review is negative. Physical Exam :    Visit Vitals  /76 (BP 1 Location: Left upper arm, BP Patient Position: Sitting, BP Cuff Size: Adult)   Pulse 76   SpO2 98%       General: Well defined, nourished, and groomed individual in no acute distress. Musculoskeletal: Extremities revealed no edema and had full range of motion of joints. Psych: Good mood and bright affect    NEUROLOGICAL EXAMINATION:    Mental Status: Alert and oriented to person, place, and time    Cranial Nerves:    II, III, IV, VI: Visual acuity grossly intact. Visual fields are normal.    Pupils are equal, round, and reactive to light and accommodation. Extra-ocular movements are full and fluid. Fundoscopic exam was benign, no ptosis or nystagmus. V-XII: Hearing is grossly intact. Facial features are symmetric, with normal sensation and strength. The palate rises symmetrically and the tongue protrudes midline. Sternocleidomastoids 5/5. Motor Examination: Normal tone, bulk, and strength, 5/5 muscle strength throughout. Coordination: Finger to nose was normal. No resting or intention tremor    Gait and Station: Steady while walking. Normal arm swing. No pronator drift. No muscle wasting or fasiculations noted. Reflexes: DTRs 2+ throughout.     Results for orders placed or performed during the hospital encounter of 11/17/21   CBC WITH AUTOMATED DIFF   Result Value Ref Range    WBC 6.1 3.6 - 11.0 K/uL    RBC 3.96 3.80 - 5.20 M/uL    HGB 11.0 (L) 11.5 - 16.0 g/dL    HCT 34.3 (L) 35.0 - 47.0 %    MCV 86.6 80.0 - 99.0 FL    MCH 27.8 26.0 - 34.0 PG    MCHC 32.1 30.0 - 36.5 g/dL    RDW 13.3 11.5 - 14.5 %    PLATELET 246 134 - 779 K/uL    MPV 10.5 8.9 - 12.9 FL    NRBC 0.0 0.0  WBC    ABSOLUTE NRBC 0.00 0.00 - 0.01 K/uL NEUTROPHILS 57 32 - 75 %    LYMPHOCYTES 34 12 - 49 %    MONOCYTES 8 5 - 13 %    EOSINOPHILS 1 0 - 7 %    BASOPHILS 0 0 - 1 %    IMMATURE GRANULOCYTES 0 0 - 0.5 %    ABS. NEUTROPHILS 3.4 1.8 - 8.0 K/UL    ABS. LYMPHOCYTES 2.1 0.8 - 3.5 K/UL    ABS. MONOCYTES 0.5 0.0 - 1.0 K/UL    ABS. EOSINOPHILS 0.1 0.0 - 0.4 K/UL    ABS. BASOPHILS 0.0 0.0 - 0.1 K/UL    ABS. IMM. GRANS. 0.0 0.00 - 0.04 K/UL    DF AUTOMATED     METABOLIC PANEL, COMPREHENSIVE   Result Value Ref Range    Sodium 138 136 - 145 mmol/L    Potassium 5.0 3.5 - 5.1 mmol/L    Chloride 108 97 - 108 mmol/L    CO2 24 21 - 32 mmol/L    Anion gap 6 5 - 15 mmol/L    Glucose 88 65 - 100 mg/dL    BUN 15 6 - 20 mg/dL    Creatinine 0.90 0.55 - 1.02 mg/dL    BUN/Creatinine ratio 17 12 - 20      GFR est AA >60 >60 ml/min/1.73m2    GFR est non-AA >60 >60 ml/min/1.73m2    Calcium 9.0 8.5 - 10.1 mg/dL    Bilirubin, total 0.2 0.2 - 1.0 mg/dL    AST (SGOT) 35 15 - 37 U/L    ALT (SGPT) 15 12 - 78 U/L    Alk. phosphatase 72 45 - 117 U/L    Protein, total 8.1 6.4 - 8.2 g/dL    Albumin 3.6 3.5 - 5.0 g/dL    Globulin 4.5 (H) 2.0 - 4.0 g/dL    A-G Ratio 0.8 (L) 1.1 - 2.2     TROPONIN-HIGH SENSITIVITY   Result Value Ref Range    Troponin-High Sensitivity 4 0 - 51 ng/L   BETA HCG, QT   Result Value Ref Range    Beta HCG, QT <1.0 0 - 6 mIU/mL   EKG, 12 LEAD, INITIAL   Result Value Ref Range    Ventricular Rate 100 BPM    Atrial Rate 100 BPM    P-R Interval 142 ms    QRS Duration 70 ms    Q-T Interval 344 ms    QTC Calculation (Bezet) 443 ms    Calculated P Axis 64 degrees    Calculated R Axis 49 degrees    Calculated T Axis 32 degrees    Diagnosis       Normal sinus rhythm  Normal ECG  No previous ECGs available  Confirmed by Skipper Luis Armando MD, Sudhakar ([id number]) on 11/18/2021 5:55:29 PM         No orders of the defined types were placed in this encounter.       1. Chronic migraine with aura      Botox has worked well so far   Continue this as she is set for March  We will also reinitiate Beverly Jackson  She had a little bit of confusion with how to dose it and what it was for so we will start it as prevention 140 every 30 days  She does use over-the-counter medication to help with rescue  She does have Nurtec if she needs it  Overall she feels like things have gotten better              This note will not be viewable in [facility]

## 2023-03-21 ENCOUNTER — OFFICE VISIT (OUTPATIENT)
Dept: NEUROLOGY | Age: 38
End: 2023-03-21
Payer: OTHER GOVERNMENT

## 2023-03-21 DIAGNOSIS — G43.109 CHRONIC MIGRAINE WITH AURA: Primary | ICD-10-CM

## 2023-03-21 PROCEDURE — 64615 CHEMODENERV MUSC MIGRAINE: CPT | Performed by: NURSE PRACTITIONER

## 2023-03-21 RX ORDER — ONDANSETRON 4 MG/1
4 TABLET, ORALLY DISINTEGRATING ORAL
Qty: 9 TABLET | Refills: 4 | Status: SHIPPED | OUTPATIENT
Start: 2023-03-21

## 2023-03-21 RX ORDER — ONABOTULINUMTOXINA 200 [USP'U]/1
200 INJECTION, POWDER, LYOPHILIZED, FOR SOLUTION INTRADERMAL; INTRAMUSCULAR ONCE
Qty: 200 UNITS | Refills: 0 | Status: SHIPPED | COMMUNITY
Start: 2023-03-21 | End: 2023-03-21

## 2023-03-21 NOTE — PROGRESS NOTES
[facility]  OFFICE PROCEDURE PROGRESS NOTE        Chart reviewed for the following:   I, [de-identified] M VIKTOR Gallagher, have reviewed the History, Physical and updated the Allergic reactions for [place] performed immediately prior to start of procedure:   I, [de-identified] M VIKTOR Gallagher, have performed the following reviews on Yan Lieberman prior to the start of the procedure:            * Patient was identified by name and date of birth   * Agreement on procedure being performed was verified  * Risks and Benefits explained to the patient  * Procedure site verified and marked as necessary  * Patient was positioned for comfort  * Consent was signed and verified     Time: 0920      Date of procedure: 3/21/2023    Procedure performed by:  Bill Hartley NP    Provider assisted by: None    Patient assisted by: None    How tolerated by patient: tolerated the procedure well with no complications    Post Procedural Pain Scale: 2 - Hurts Little Bit    Comments: None      Botox Injection Note       Indication: patient has chronic recurrent migraine, has 7-10 less migraine days per month with botox injections    Procedure:   Botox concentration: 200 units in 4 ml of preservative-free normal saline. 31 sites injections, distribution as follow      Units/site  Sites Sides Subtotal    Procerus 5 1 1 5    5 1 2 10   Frontalis 5 2 2 20   Temporalis 5 4 2 40   Occipitalis 5 3 2 30   Upper cervical paraspinalis 5 2 2 20   Trapezius 5 3 2 30         200 units Botox were reconstituted, 155 units injected as above and the remainder was unavoidably wasted.      Patient tolerated procedure well.     _____________________________   Shanti Lima

## 2023-06-16 ENCOUNTER — TELEPHONE (OUTPATIENT)
Age: 38
End: 2023-06-16

## 2023-06-20 ENCOUNTER — PROCEDURE VISIT (OUTPATIENT)
Age: 38
End: 2023-06-20
Payer: OTHER GOVERNMENT

## 2023-06-20 DIAGNOSIS — G43.711 CHRONIC MIGRAINE WITHOUT AURA, INTRACTABLE, WITH STATUS MIGRAINOSUS: Primary | ICD-10-CM

## 2023-06-20 PROCEDURE — 64615 CHEMODENERV MUSC MIGRAINE: CPT | Performed by: NURSE PRACTITIONER

## 2023-06-20 NOTE — PROGRESS NOTES
OFFICE PROCEDURE PROGRESS NOTE        Chart reviewed for the following:   Wilian CLAY APRN - NP, have reviewed the History, Physical and updated the Allergic reactions for [place] performed immediately prior to start of procedure:   Wilian CLAY APRN - NP, have performed the following reviews on Scooby Rahman prior to the start of the procedure:            * Patient was identified by name and date of birth   * Agreement on procedure being performed was verified  * Risks and Benefits explained to the patient  * Procedure site verified and marked as necessary  * Patient was positioned for comfort  * Consent was signed and verified     Time: 1000      Date of procedure: 6/20/2023    Procedure performed by:  RICKY Downing NP    Provider assisted by: None    Patient assisted by: None    How tolerated by patient: tolerated the procedure well with no complications    Post Procedural Pain Scale: 2 - Hurts Little Bit    Comments: None    VALENTINDP:[id number]  EXP: 02/2026          Botox Injection Note       Indication: patient has chronic recurrent migraine, has 7-10 less migraine days per month with botox injections    Procedure:   Botox concentration: 200 units in 4 ml of preservative-free normal saline. 31 sites injections, distribution as follow      Units/site  Sites Sides Subtotal    Procerus 5 1 1 5    5 1 2 10   Frontalis 5 2 2 20   Temporalis 5 4 2 40   Occipitalis 5 3 2 30   Upper cervical paraspinalis 5 2 2 20   Trapezius 5 3 2 30         200 units Botox were reconstituted, 155 units injected as above and the remainder was unavoidably wasted.      Patient tolerated procedure well.     _____________________________   Tyler Blanton

## 2023-09-05 ENCOUNTER — TELEPHONE (OUTPATIENT)
Age: 38
End: 2023-09-05

## 2023-09-11 ENCOUNTER — TELEPHONE (OUTPATIENT)
Age: 38
End: 2023-09-11

## 2023-09-14 ENCOUNTER — TELEPHONE (OUTPATIENT)
Age: 38
End: 2023-09-14

## 2023-09-20 ENCOUNTER — TELEPHONE (OUTPATIENT)
Age: 38
End: 2023-09-20

## 2023-09-21 ENCOUNTER — TELEPHONE (OUTPATIENT)
Age: 38
End: 2023-09-21

## 2023-09-21 DIAGNOSIS — G43.109 MIGRAINE WITH AURA, NOT INTRACTABLE, WITHOUT STATUS MIGRAINOSUS: Primary | ICD-10-CM

## 2023-09-21 RX ORDER — ACETAZOLAMIDE 500 MG/1
500 CAPSULE, EXTENDED RELEASE ORAL 2 TIMES DAILY
Qty: 60 CAPSULE | Refills: 3 | Status: SHIPPED | OUTPATIENT
Start: 2023-09-21

## 2023-09-21 RX ORDER — RIMEGEPANT SULFATE 75 MG/75MG
TABLET, ORALLY DISINTEGRATING ORAL
Qty: 8 TABLET | Refills: 3 | Status: SHIPPED | OUTPATIENT
Start: 2023-09-21

## 2023-09-21 RX ORDER — ONDANSETRON 4 MG/1
4 TABLET, ORALLY DISINTEGRATING ORAL EVERY 8 HOURS PRN
Qty: 30 TABLET | Refills: 5 | Status: SHIPPED | OUTPATIENT
Start: 2023-09-21

## 2023-09-21 RX ORDER — ERENUMAB-AOOE 140 MG/ML
140 INJECTION, SOLUTION SUBCUTANEOUS
Qty: 1 ADJUSTABLE DOSE PRE-FILLED PEN SYRINGE | Refills: 3 | Status: SHIPPED | OUTPATIENT
Start: 2023-09-21

## 2023-09-21 NOTE — TELEPHONE ENCOUNTER
[address]  Fax [phone]    Requesting last office visit notes

## 2023-09-25 ENCOUNTER — TELEPHONE (OUTPATIENT)
Age: 38
End: 2023-09-25

## 2023-09-25 NOTE — TELEPHONE ENCOUNTER
Pharmacy is calling regarding  the insurance needing additional information for Botox and [facility] has to be listed and the serving facility.     Please contact

## 2023-09-29 ENCOUNTER — TELEPHONE (OUTPATIENT)
Age: 38
End: 2023-09-29

## 2023-10-12 ENCOUNTER — TELEPHONE (OUTPATIENT)
Age: 38
End: 2023-10-12

## 2023-10-20 ENCOUNTER — TELEPHONE (OUTPATIENT)
Age: 38
End: 2023-10-20

## 2023-10-20 DIAGNOSIS — G43.109 MIGRAINE WITH AURA, NOT INTRACTABLE, WITHOUT STATUS MIGRAINOSUS: ICD-10-CM

## 2023-10-20 NOTE — TELEPHONE ENCOUNTER
Question - Aimovig    Written as auto injector. However, description on Rx \"pre filled syringe:     Please clarify        Nurtec PA sent to [employer] for [employer] via [employer]    Key: [id number] pending    [employer] is processing your inquiry and will respond shortly with next steps.  You are currently using the fastest method to process this request. Please do not fax or call [employer] to resubmit this request.

## 2023-10-23 RX ORDER — ERENUMAB-AOOE 140 MG/ML
140 INJECTION, SOLUTION SUBCUTANEOUS
Qty: 1 ML | Refills: 3 | Status: SHIPPED | OUTPATIENT
Start: 2023-10-23

## 2023-10-27 ENCOUNTER — TELEPHONE (OUTPATIENT)
Age: 38
End: 2023-10-27

## 2023-10-31 ENCOUNTER — TELEPHONE (OUTPATIENT)
Age: 38
End: 2023-10-31

## 2023-10-31 NOTE — TELEPHONE ENCOUNTER
Nurtec 8 per 30 on CM for [employer] for     Approved    Approved on October 27  CaseId:[id number];Status:Approved; Review Type:Prior Auth; Coverage Start Date:09/27/2023; Coverage End Date:04/24/2024    Aimovig PA sent to [facility]  via [facility]  Key: [address]  Pending return of clinicals

## 2023-11-01 NOTE — TELEPHONE ENCOUNTER
Patient is approved, I have updated the original note from 9/25 and 6/16/23 to reflect the approval information

## 2023-11-03 ENCOUNTER — TELEPHONE (OUTPATIENT)
Age: 38
End: 2023-11-03

## 2023-11-03 DIAGNOSIS — G43.109 MIGRAINE WITH AURA, NOT INTRACTABLE, WITHOUT STATUS MIGRAINOSUS: Primary | ICD-10-CM

## 2023-11-03 NOTE — TELEPHONE ENCOUNTER
stated that the script was stopped due to [facility] not being listed on the auth. was submitted on the . sent in request to open and run script. was given 2-3 buisness days for the request to be processed. mentioned script is to  on .  recommed we send in a script

## 2023-11-03 NOTE — TELEPHONE ENCOUNTER
Encounter 6/16    Drug: BOTOX, Dx: M03.004  Insurance: [address] [place] [address]  Submission Type:   Authorization #: [id number]  HCPCS: - Approved  CPT: 96420- Approved  Approval Range: 08/24/23 to 08/23/24  Pharmacy: [facility], listed on Authorization    November 1, 2023  Mara Schneider     [facility]    11/1/23  3:49 PM  Note     [facility] has been approved by Jessica Galmaye and is now listed as the preferred Facility on authorization

## 2023-11-03 NOTE — TELEPHONE ENCOUNTER
Patient would like to schedule her Botox appointment. Stated her last 3 appointments have been canceled and she is suffering with migraines.

## 2023-11-09 ENCOUNTER — TELEPHONE (OUTPATIENT)
Age: 38
End: 2023-11-09

## 2023-11-10 ENCOUNTER — TELEPHONE (OUTPATIENT)
Age: 38
End: 2023-11-10

## 2023-11-16 ENCOUNTER — TELEPHONE (OUTPATIENT)
Age: 38
End: 2023-11-16

## 2023-11-21 ENCOUNTER — HOSPITAL ENCOUNTER (OUTPATIENT)
Facility: HOSPITAL | Age: 38
Discharge: HOME | End: 2023-11-24
Payer: OTHER GOVERNMENT

## 2023-11-21 DIAGNOSIS — D35.2 MICROPROLACTINOMA (HCC): ICD-10-CM

## 2023-11-21 LAB — CREAT BLD-MCNC: 0.9 MG/DL (ref 0.6–1.3)

## 2023-11-21 PROCEDURE — 82565 ASSAY OF CREATININE: CPT

## 2023-11-21 PROCEDURE — 6360000004 HC RX CONTRAST MEDICATION: Performed by: INTERNAL MEDICINE

## 2023-11-21 PROCEDURE — 70553 MRI BRAIN STEM W/O & W/DYE: CPT | Performed by: INTERNAL MEDICINE

## 2023-11-21 PROCEDURE — A9575 INJ GADOTERATE MEGLUMI 0.1ML: HCPCS | Performed by: INTERNAL MEDICINE

## 2023-11-21 RX ORDER — GADOTERATE MEGLUMINE 376.9 MG/ML
17 INJECTION INTRAVENOUS ONCE
Status: COMPLETED | OUTPATIENT
Start: 2023-11-21 | End: 2023-11-21

## 2023-11-21 RX ADMIN — GADOTERATE MEGLUMINE 17 ML: 376.9 INJECTION, SOLUTION INTRAVENOUS at 20:41

## 2023-11-27 ENCOUNTER — PROCEDURE VISIT (OUTPATIENT)
Age: 38
End: 2023-11-27
Payer: OTHER GOVERNMENT

## 2023-11-27 DIAGNOSIS — G43.711 CHRONIC MIGRAINE WITHOUT AURA, INTRACTABLE, WITH STATUS MIGRAINOSUS: Primary | ICD-10-CM

## 2023-11-27 PROCEDURE — 64615 CHEMODENERV MUSC MIGRAINE: CPT | Performed by: NURSE PRACTITIONER

## 2023-11-27 NOTE — PROGRESS NOTES
OFFICE PROCEDURE PROGRESS NOTE        Chart reviewed for the following:   Wilian CLAY APRN - NP, have reviewed the History, Physical and updated the Allergic reactions for [address] performed immediately prior to start of procedure:   Wilian CLAY APRN - NP, have performed the following reviews on Renard Coles prior to the start of the procedure:            * Patient was identified by name and date of birth   * Agreement on procedure being performed was verified  * Risks and Benefits explained to the patient  * Procedure site verified and marked as necessary  * Patient was positioned for comfort  * Consent was signed and verified     Time: 0900      Date of procedure: 11/27/2023    Procedure performed by:  RICKY Urias NP    Provider assisted by: None    Patient assisted by: None    How tolerated by patient: tolerated the procedure well with no complications    Post Procedural Pain Scale: 2 - Hurts Little Bit    Comments: None    [facility]:[id number]  EXP: 04/2026          Botox Injection Note       Indication: patient has chronic recurrent migraine, has 7-10 less migraine days per month with botox injections    Procedure:   Botox concentration: 200 units in 4 ml of preservative-free normal saline. 31 sites injections, distribution as follow      Units/site  Sites Sides Subtotal    Procerus 5 1 1 5    5 1 2 10   Frontalis 5 2 2 20   Temporalis 5 4 2 40   Occipitalis 5 3 2 30   Upper cervical paraspinalis 5 2 2 20   Trapezius 5 3 2 30         200 units Botox were reconstituted, 155 units injected as above and the remainder was unavoidably wasted.      Patient tolerated procedure well.     _____________________________   Milton Ambriz

## 2024-01-05 ENCOUNTER — HOSPITAL ENCOUNTER (EMERGENCY)
Facility: HOSPITAL | Age: 39
Discharge: HOME | End: 2024-01-05
Attending: EMERGENCY MEDICINE
Payer: OTHER GOVERNMENT

## 2024-01-05 VITALS
TEMPERATURE: 98.6 F | SYSTOLIC BLOOD PRESSURE: 124 MMHG | WEIGHT: 210 LBS | HEART RATE: 89 BPM | HEIGHT: 68 IN | RESPIRATION RATE: 16 BRPM | OXYGEN SATURATION: 100 % | BODY MASS INDEX: 31.83 KG/M2 | DIASTOLIC BLOOD PRESSURE: 82 MMHG

## 2024-01-05 DIAGNOSIS — S39.011A STRAIN OF ABDOMINAL MUSCLE, INITIAL ENCOUNTER: ICD-10-CM

## 2024-01-05 DIAGNOSIS — R10.2 SUPRAPUBIC PAIN: Primary | ICD-10-CM

## 2024-01-05 LAB
APPEARANCE UR: CLEAR
BACTERIA URNS QL MICRO: NEGATIVE /HPF
BILIRUB UR QL: NEGATIVE
COLOR UR: NORMAL
EPITH CASTS URNS QL MICRO: NORMAL /LPF
GLUCOSE UR STRIP.AUTO-MCNC: NEGATIVE MG/DL
HGB UR QL STRIP: NEGATIVE
KETONES UR QL STRIP.AUTO: NEGATIVE MG/DL
LEUKOCYTE ESTERASE UR QL STRIP.AUTO: NEGATIVE
MUCOUS THREADS URNS QL MICRO: NORMAL /LPF
NITRITE UR QL STRIP.AUTO: NEGATIVE
PH UR STRIP: 5 (ref 5–8)
PROT UR STRIP-MCNC: NEGATIVE MG/DL
RBC #/AREA URNS HPF: NORMAL /HPF (ref 0–5)
SP GR UR REFRACTOMETRY: 1.03 (ref 1–1.03)
URINE CULTURE IF INDICATED: NORMAL
UROBILINOGEN UR QL STRIP.AUTO: 0.1 EU/DL (ref 0.1–1)
WBC URNS QL MICRO: NORMAL /HPF (ref 0–4)

## 2024-01-05 PROCEDURE — 6370000000 HC RX 637 (ALT 250 FOR IP): Performed by: EMERGENCY MEDICINE

## 2024-01-05 PROCEDURE — 99283 EMERGENCY DEPT VISIT LOW MDM: CPT

## 2024-01-05 PROCEDURE — 81001 URINALYSIS AUTO W/SCOPE: CPT

## 2024-01-05 RX ORDER — ACETAMINOPHEN 500 MG
1000 TABLET ORAL
Status: COMPLETED | OUTPATIENT
Start: 2024-01-05 | End: 2024-01-05

## 2024-01-05 RX ADMIN — ACETAMINOPHEN 1000 MG: 500 TABLET ORAL at 22:29

## 2024-01-05 ASSESSMENT — PAIN SCALES - GENERAL
PAINLEVEL_OUTOF10: 7
PAINLEVEL_OUTOF10: 8

## 2024-01-05 ASSESSMENT — LIFESTYLE VARIABLES
HOW OFTEN DO YOU HAVE A DRINK CONTAINING ALCOHOL: NEVER
HOW MANY STANDARD DRINKS CONTAINING ALCOHOL DO YOU HAVE ON A TYPICAL DAY: PATIENT DOES NOT DRINK

## 2024-01-05 ASSESSMENT — PAIN - FUNCTIONAL ASSESSMENT: PAIN_FUNCTIONAL_ASSESSMENT: 0-10

## 2024-01-06 NOTE — DISCHARGE INSTRUCTIONS
You were seen in the ER for your lower abdominal pain. Thankfully, your urine did not show any signs of a urinary tract infection. This may be from a strain of your abdominal wall muscles. There are other pelvic conditions that can cause this type of pain that are better managed by an OBGYN, but they are not dangerous. You should follow up with your PCP or the OBGYN we have given you regarding this pain if it does not improve.    You can take tylenol or ibuprofen for aches and pains for the next few days. If it is really persistent, you can alternate these every 3 hours so that you always have something on board. For instance, you can take tylenol at 9am, ibuprofen at noon, tylenol again at 3pm and ibuprofen again at 6pm.     Return to the ER for any new or worsening pain, pain just on one side, vomiting, diarrhea, fevers, or any other new or concerning symptoms.         Thank you!  Thank you for allowing me to care for you in the emergency department. It is my goal to provide you with excellent care. If you have not received excellent quality care, please ask to speak to the nurse manager. Please fill out the survey that will come to you by mail or email since we listen to your feedback!     Below you will find a list of your tests from today's visit.  Should you have any questions, please do not hesitate to call the emergency department.    Labs  Recent Results (from the past 12 hour(s))   Urinalysis with Reflex to Culture    Collection Time: 01/05/24 10:29 PM    Specimen: Urine   Result Value Ref Range    Color, UA Yellow/Straw      Appearance Clear Clear      Specific Gravity, UA 1.027 1.003 - 1.030      pH, Urine 5.0 5.0 - 8.0      Protein, UA Negative Negative mg/dL    Glucose, UA Negative Negative mg/dL    Ketones, Urine Negative Negative mg/dL    Bilirubin Urine Negative Negative      Blood, Urine Negative Negative      Urobilinogen, Urine 0.1 0.1 - 1.0 EU/dL    Nitrite, Urine Negative Negative

## 2024-01-06 NOTE — ED PROVIDER NOTES
EMERGENCY DEPARTMENT HISTORY AND PHYSICAL EXAM      Date: 1/5/2024  Patient Name: Kita Patton      History of Presenting Illness     Chief Complaint   Patient presents with    Abdominal Pain       History Provided By: patient    HPI: Kita Patton, 38 y.o. female with a past medical history significant for HLD presents to the ED with cc of abdominal pain. Endorsing lower midline abdominal pain that's worse with walking up stairs and various movements. Denies dysuria, frequency, hematuria, malodorous urine. Has never had a UTI. Had this pain once 1mo ago. Denies F/C/N/V/D, cough, CP, SOB.    There are no other complaints, changes, or physical findings at this time.    PCP: Makayla Buitrago APRN - NP    No current facility-administered medications for this encounter.     Current Outpatient Medications   Medication Sig Dispense Refill    Onabotulinumtoxin A (BOTOX) 200 units injection Inject 155 units into 31 FDA approved sites in head, face, neck, every 3 months for chronic migraine. 1 each 3    Erenumab-aooe (AIMOVIG) 140 MG/ML SOAJ Inject 140 mg into the skin every 30 days 1 mL 3    ondansetron (ZOFRAN-ODT) 4 MG disintegrating tablet Take 1 tablet by mouth every 8 hours as needed for Nausea or Vomiting 30 tablet 5    Rimegepant Sulfate (NURTEC) 75 MG TBDP 1 tablet at HA onset PRN. Max 1 tablet in 24 hours. 8 tablet 3    meloxicam (MOBIC) 7.5 MG tablet TAKE 1 TABLET BY MOUTH IN THE MORNING AND IN THE EVENING 60 tablet 2    acetaminophen (TYLENOL) 325 MG tablet Take 2 tablets by mouth every 6 hours as needed      cabergoline (DOSTINEX) 0.5 MG tablet Take 1 tablet by mouth Twice a Week      clobetasol (TEMOVATE) 0.05 % ointment Apply topically 2 times daily      diclofenac sodium (VOLTAREN) 1 % GEL Apply topically 4 times daily      diphenhydrAMINE (BENADRYL) 25 MG capsule Take 1 capsule by mouth every 6 hours as needed      fluocinolone (DERMA-SMOOTHE) 0.01 % OIL external oil Apply topically      ketoconazole

## 2024-01-06 NOTE — ED TRIAGE NOTES
Pt reports abdominal pain x2 days, denies n/v/d, reports pain is in one spot, no urinary complications

## 2024-02-02 ENCOUNTER — TELEPHONE (OUTPATIENT)
Age: 39
End: 2024-02-02

## 2024-03-01 ENCOUNTER — PROCEDURE VISIT (OUTPATIENT)
Age: 39
End: 2024-03-01

## 2024-03-01 DIAGNOSIS — G43.711 CHRONIC MIGRAINE WITHOUT AURA, INTRACTABLE, WITH STATUS MIGRAINOSUS: Primary | ICD-10-CM

## 2024-03-01 DIAGNOSIS — G43.109 MIGRAINE WITH AURA, NOT INTRACTABLE, WITHOUT STATUS MIGRAINOSUS: ICD-10-CM

## 2024-03-01 RX ORDER — ERENUMAB-AOOE 140 MG/ML
140 INJECTION, SOLUTION SUBCUTANEOUS
Qty: 1 ML | Refills: 3 | Status: SHIPPED | OUTPATIENT
Start: 2024-03-01

## 2024-03-01 NOTE — PROGRESS NOTES
OFFICE PROCEDURE PROGRESS NOTE        Chart reviewed for the following:   Wilian CLAY APRN - NP, have reviewed the History, Physical and updated the Allergic reactions for Kita Abdi     TIME OUT performed immediately prior to start of procedure:   Wilian CLAY APRN - NP, have performed the following reviews on Kita [place] prior to the start of the procedure:            * Patient was identified by name and date of birth   * Agreement on procedure being performed was verified  * Risks and Benefits explained to the patient  * Procedure site verified and marked as necessary  * Patient was positioned for comfort  * Consent was signed and verified     Time: 1000      Date of procedure: 3/1/2024    Procedure performed by:  RICKY Gaston NP    Provider assisted by: None    Patient assisted by: None    How tolerated by patient: tolerated the procedure well with no complications    Post Procedural Pain Scale: 2 - Hurts Little Bit    Comments: None    LOT: [id number]  EXP: 06/2026          Botox Injection Note       Indication: patient has chronic recurrent migraine, has 7-10 less migraine days per month with botox injections    Procedure:   Botox concentration: 200 units in 4 ml of preservative-free normal saline.   31 sites injections, distribution as follow      Units/site  Sites Sides Subtotal    Procerus 5 1 1 5    5 1 2 10   Frontalis 5 2 2 20   Temporalis 5 4 2 40   Occipitalis 5 3 2 30   Upper cervical paraspinalis 5 2 2 20   Trapezius 5 3 2 30         200 units Botox were reconstituted, 155 units injected as above and the remainder was unavoidably wasted.     Patient tolerated procedure well.     _____________________________   WILIAN FLORES

## 2024-03-07 ENCOUNTER — TELEPHONE (OUTPATIENT)
Age: 39
End: 2024-03-07

## 2024-03-07 NOTE — TELEPHONE ENCOUNTER
RE: Aimovig     Approved:  02/06/2024 -- 09/03/2024    SAIRA- Key [id number]    I will scan the approval letter upon receipt.    Jamaali to nurse.

## 2024-05-10 ENCOUNTER — TELEPHONE (OUTPATIENT)
Age: 39
End: 2024-05-10

## 2024-05-15 ENCOUNTER — TELEPHONE (OUTPATIENT)
Age: 39
End: 2024-05-15

## 2024-05-24 ENCOUNTER — PROCEDURE VISIT (OUTPATIENT)
Age: 39
End: 2024-05-24
Payer: OTHER GOVERNMENT

## 2024-05-24 DIAGNOSIS — G43.711 CHRONIC MIGRAINE WITHOUT AURA, INTRACTABLE, WITH STATUS MIGRAINOSUS: Primary | ICD-10-CM

## 2024-05-24 PROCEDURE — 64615 CHEMODENERV MUSC MIGRAINE: CPT | Performed by: NURSE PRACTITIONER

## 2024-05-24 NOTE — PROGRESS NOTES
OFFICE PROCEDURE PROGRESS NOTE        Chart reviewed for the following:   Wilian CLAY APRN - NP, have reviewed the History, Physical and updated the Allergic reactions for Kita Abdi     TIME OUT performed immediately prior to start of procedure:   Wilian CLAY APRN - NP, have performed the following reviews on Kita Labette prior to the start of the procedure:            * Patient was identified by name and date of birth   * Agreement on procedure being performed was verified  * Risks and Benefits explained to the patient  * Procedure site verified and marked as necessary  * Patient was positioned for comfort  * Consent was signed and verified     Time: 0920      Date of procedure: 5/24/2024    Procedure performed by:  RICKY Gaston NP    Provider assisted by: None    Patient assisted by: None    How tolerated by patient: tolerated the procedure well with no complications    Post Procedural Pain Scale: 2 - Hurts Little Bit    Comments: None    LOT: [id number]  EXP: 11/2026          Botox Injection Note       Indication: patient has chronic recurrent migraine, has 7-10 less migraine days per month with botox injections    Procedure:   Botox concentration: 200 units in 4 ml of preservative-free normal saline.   31 sites injections, distribution as follow      Units/site  Sites Sides Subtotal    Procerus 5 1 1 5    5 1 2 10   Frontalis 5 2 2 20   Temporalis 5 4 2 40   Occipitalis 5 3 2 30   Upper cervical paraspinalis 5 2 2 20   Trapezius 5 3 2 30         200 units Botox were reconstituted, 155 units injected as above and the remainder was unavoidably wasted.     Patient tolerated procedure well.     _____________________________   WILIAN FLORES         Feeling a lot better with her pituitary shrinking  Possibly will be the last Botox we will reevaluate when he gets closer

## 2024-06-30 ENCOUNTER — HOSPITAL ENCOUNTER (EMERGENCY)
Facility: HOSPITAL | Age: 39
Discharge: HOME | End: 2024-06-30
Attending: STUDENT IN AN ORGANIZED HEALTH CARE EDUCATION/TRAINING PROGRAM
Payer: OTHER GOVERNMENT

## 2024-06-30 ENCOUNTER — APPOINTMENT (OUTPATIENT)
Facility: HOSPITAL | Age: 39
End: 2024-06-30
Payer: OTHER GOVERNMENT

## 2024-06-30 VITALS
HEART RATE: 90 BPM | TEMPERATURE: 98.8 F | RESPIRATION RATE: 18 BRPM | OXYGEN SATURATION: 100 % | DIASTOLIC BLOOD PRESSURE: 67 MMHG | BODY MASS INDEX: 33.34 KG/M2 | WEIGHT: 220 LBS | HEIGHT: 68 IN | SYSTOLIC BLOOD PRESSURE: 111 MMHG

## 2024-06-30 DIAGNOSIS — S39.011D STRAIN OF ABDOMINAL MUSCLE, SUBSEQUENT ENCOUNTER: ICD-10-CM

## 2024-06-30 DIAGNOSIS — R10.2 SUPRAPUBIC PAIN: Primary | ICD-10-CM

## 2024-06-30 LAB
ALBUMIN SERPL-MCNC: 3.7 G/DL (ref 3.5–5)
ALBUMIN/GLOB SERPL: 0.8 (ref 1.1–2.2)
ALP SERPL-CCNC: 70 U/L (ref 45–117)
ALT SERPL-CCNC: 20 U/L (ref 12–78)
ANION GAP SERPL CALC-SCNC: 4 MMOL/L (ref 5–15)
APPEARANCE UR: CLEAR
AST SERPL W P-5'-P-CCNC: 22 U/L (ref 15–37)
BACTERIA URNS QL MICRO: NEGATIVE /HPF
BASOPHILS # BLD: 0 K/UL (ref 0–0.1)
BASOPHILS NFR BLD: 1 % (ref 0–1)
BILIRUB SERPL-MCNC: 0.2 MG/DL (ref 0.2–1)
BILIRUB UR QL: NEGATIVE
BUN SERPL-MCNC: 20 MG/DL (ref 6–20)
BUN/CREAT SERPL: 20 (ref 12–20)
CA-I BLD-MCNC: 9 MG/DL (ref 8.5–10.1)
CHLORIDE SERPL-SCNC: 110 MMOL/L (ref 97–108)
CO2 SERPL-SCNC: 23 MMOL/L (ref 21–32)
COLOR UR: ABNORMAL
CREAT SERPL-MCNC: 1 MG/DL (ref 0.55–1.02)
DIFFERENTIAL METHOD BLD: ABNORMAL
EOSINOPHIL # BLD: 0.1 K/UL (ref 0–0.4)
EOSINOPHIL NFR BLD: 1 % (ref 0–7)
EPITH CASTS URNS QL MICRO: ABNORMAL /LPF
ERYTHROCYTE [DISTWIDTH] IN BLOOD BY AUTOMATED COUNT: 13.9 % (ref 11.5–14.5)
GLOBULIN SER CALC-MCNC: 4.5 G/DL (ref 2–4)
GLUCOSE SERPL-MCNC: 110 MG/DL (ref 65–100)
GLUCOSE UR STRIP.AUTO-MCNC: NEGATIVE MG/DL
HCG UR QL: NEGATIVE
HCT VFR BLD AUTO: 32.9 % (ref 35–47)
HGB BLD-MCNC: 10.5 G/DL (ref 11.5–16)
HGB UR QL STRIP: ABNORMAL
IMM GRANULOCYTES # BLD AUTO: 0 K/UL (ref 0–0.04)
IMM GRANULOCYTES NFR BLD AUTO: 0 % (ref 0–0.5)
KETONES UR QL STRIP.AUTO: 5 MG/DL
LEUKOCYTE ESTERASE UR QL STRIP.AUTO: NEGATIVE
LYMPHOCYTES # BLD: 2.2 K/UL (ref 0.8–3.5)
LYMPHOCYTES NFR BLD: 36 % (ref 12–49)
MCH RBC QN AUTO: 26.9 PG (ref 26–34)
MCHC RBC AUTO-ENTMCNC: 31.9 G/DL (ref 30–36.5)
MCV RBC AUTO: 84.4 FL (ref 80–99)
MONOCYTES # BLD: 0.4 K/UL (ref 0–1)
MONOCYTES NFR BLD: 7 % (ref 5–13)
MUCOUS THREADS URNS QL MICRO: ABNORMAL /LPF
NEUTS SEG # BLD: 3.4 K/UL (ref 1.8–8)
NEUTS SEG NFR BLD: 55 % (ref 32–75)
NITRITE UR QL STRIP.AUTO: NEGATIVE
NRBC # BLD: 0 K/UL (ref 0–0.01)
NRBC BLD-RTO: 0 PER 100 WBC
PH UR STRIP: 5 (ref 5–8)
PLATELET # BLD AUTO: 306 K/UL (ref 150–400)
PMV BLD AUTO: 10 FL (ref 8.9–12.9)
POTASSIUM SERPL-SCNC: 3.5 MMOL/L (ref 3.5–5.1)
PROT SERPL-MCNC: 8.2 G/DL (ref 6.4–8.2)
PROT UR STRIP-MCNC: NEGATIVE MG/DL
RBC # BLD AUTO: 3.9 M/UL (ref 3.8–5.2)
RBC #/AREA URNS HPF: ABNORMAL /HPF (ref 0–5)
SODIUM SERPL-SCNC: 137 MMOL/L (ref 136–145)
SP GR UR REFRACTOMETRY: 1.03 (ref 1–1.03)
URINE CULTURE IF INDICATED: ABNORMAL
UROBILINOGEN UR QL STRIP.AUTO: 0.1 EU/DL (ref 0.1–1)
WBC # BLD AUTO: 6 K/UL (ref 3.6–11)
WBC URNS QL MICRO: ABNORMAL /HPF (ref 0–4)

## 2024-06-30 PROCEDURE — 81025 URINE PREGNANCY TEST: CPT

## 2024-06-30 PROCEDURE — 6360000002 HC RX W HCPCS

## 2024-06-30 PROCEDURE — 80053 COMPREHEN METABOLIC PANEL: CPT

## 2024-06-30 PROCEDURE — 99285 EMERGENCY DEPT VISIT HI MDM: CPT

## 2024-06-30 PROCEDURE — 96374 THER/PROPH/DIAG INJ IV PUSH: CPT

## 2024-06-30 PROCEDURE — 36415 COLL VENOUS BLD VENIPUNCTURE: CPT

## 2024-06-30 PROCEDURE — 81001 URINALYSIS AUTO W/SCOPE: CPT

## 2024-06-30 PROCEDURE — 6360000004 HC RX CONTRAST MEDICATION

## 2024-06-30 PROCEDURE — 85025 COMPLETE CBC W/AUTO DIFF WBC: CPT

## 2024-06-30 PROCEDURE — 74177 CT ABD & PELVIS W/CONTRAST: CPT

## 2024-06-30 RX ORDER — KETOROLAC TROMETHAMINE 15 MG/ML
15 INJECTION, SOLUTION INTRAMUSCULAR; INTRAVENOUS
Status: COMPLETED | OUTPATIENT
Start: 2024-06-30 | End: 2024-06-30

## 2024-06-30 RX ORDER — NAPROXEN 500 MG/1
500 TABLET ORAL 2 TIMES DAILY PRN
Qty: 60 TABLET | Refills: 0 | Status: SHIPPED | OUTPATIENT
Start: 2024-06-30

## 2024-06-30 RX ADMIN — IOPAMIDOL 100 ML: 755 INJECTION, SOLUTION INTRAVENOUS at 20:37

## 2024-06-30 RX ADMIN — KETOROLAC TROMETHAMINE 15 MG: 15 INJECTION, SOLUTION INTRAMUSCULAR; INTRAVENOUS at 19:54

## 2024-06-30 ASSESSMENT — PAIN SCALES - GENERAL
PAINLEVEL_OUTOF10: 6
PAINLEVEL_OUTOF10: 10
PAINLEVEL_OUTOF10: 10

## 2024-06-30 ASSESSMENT — PAIN DESCRIPTION - ORIENTATION: ORIENTATION: RIGHT

## 2024-06-30 ASSESSMENT — PAIN DESCRIPTION - DESCRIPTORS: DESCRIPTORS: SHOOTING;STABBING

## 2024-06-30 NOTE — ED PROVIDER NOTES
alert and oriented to person, place, and time.   Psychiatric:         Mood and Affect: Mood normal.         Behavior: Behavior normal.         SCREENINGS                No data recorded    LAB, EKG AND DIAGNOSTIC RESULTS   Labs:  Recent Results (from the past 12 hour(s))   CBC with Auto Differential    Collection Time: 06/30/24  6:50 PM   Result Value Ref Range    WBC 6.0 3.6 - 11.0 K/uL    RBC 3.90 3.80 - 5.20 M/uL    Hemoglobin 10.5 (L) 11.5 - 16.0 g/dL    Hematocrit 32.9 (L) 35.0 - 47.0 %    MCV 84.4 80.0 - 99.0 FL    MCH 26.9 26.0 - 34.0 PG    MCHC 31.9 30.0 - 36.5 g/dL    RDW 13.9 11.5 - 14.5 %    Platelets 306 150 - 400 K/uL    MPV 10.0 8.9 - 12.9 FL    Nucleated RBCs 0.0 0.0  WBC    nRBC 0.00 0.00 - 0.01 K/uL    Neutrophils % 55 32 - 75 %    Lymphocytes % 36 12 - 49 %    Monocytes % 7 5 - 13 %    Eosinophils % 1 0 - 7 %    Basophils % 1 0 - 1 %    Immature Granulocytes % 0 0 - 0.5 %    Neutrophils Absolute 3.4 1.8 - 8.0 K/UL    Lymphocytes Absolute 2.2 0.8 - 3.5 K/UL    Monocytes Absolute 0.4 0.0 - 1.0 K/UL    Eosinophils Absolute 0.1 0.0 - 0.4 K/UL    Basophils Absolute 0.0 0.0 - 0.1 K/UL    Immature Granulocytes Absolute 0.0 0.00 - 0.04 K/UL    Differential Type AUTOMATED     Comprehensive Metabolic Panel    Collection Time: 06/30/24  6:50 PM   Result Value Ref Range    Sodium 137 136 - 145 mmol/L    Potassium 3.5 3.5 - 5.1 mmol/L    Chloride 110 (H) 97 - 108 mmol/L    CO2 23 21 - 32 mmol/L    Anion Gap 4 (L) 5 - 15 mmol/L    Glucose 110 (H) 65 - 100 mg/dL    BUN 20 6 - 20 mg/dL    Creatinine 1.00 0.55 - 1.02 mg/dL    BUN/Creatinine Ratio 20 12 - 20      Est, Glom Filt Rate 74 >60 ml/min/1.73m2    Calcium 9.0 8.5 - 10.1 mg/dL    Total Bilirubin 0.2 0.2 - 1.0 mg/dL    AST 22 15 - 37 U/L    ALT 20 12 - 78 U/L    Alk Phosphatase 70 45 - 117 U/L    Total Protein 8.2 6.4 - 8.2 g/dL    Albumin 3.7 3.5 - 5.0 g/dL    Globulin 4.5 (H) 2.0 - 4.0 g/dL    Albumin/Globulin Ratio 0.8 (L) 1.1 - 2.2     Urinalysis

## 2024-07-01 NOTE — DISCHARGE INSTRUCTIONS
Thank you for choosing our Emergency Department for your care.  It is our privilege to care for you in your time of need.  In the next several days, you may receive a survey via email or mailed to your home about your experience with our team.  We would greatly appreciate you taking a few minutes to complete the survey, as we use this information to learn what we have done well and what we could be doing better. Thank you for trusting us with your care!    Below you will find a list of your tests from today's visit.   Labs  Recent Results (from the past 12 hour(s))   CBC with Auto Differential    Collection Time: 06/30/24  6:50 PM   Result Value Ref Range    WBC 6.0 3.6 - 11.0 K/uL    RBC 3.90 3.80 - 5.20 M/uL    Hemoglobin 10.5 (L) 11.5 - 16.0 g/dL    Hematocrit 32.9 (L) 35.0 - 47.0 %    MCV 84.4 80.0 - 99.0 FL    MCH 26.9 26.0 - 34.0 PG    MCHC 31.9 30.0 - 36.5 g/dL    RDW 13.9 11.5 - 14.5 %    Platelets 306 150 - 400 K/uL    MPV 10.0 8.9 - 12.9 FL    Nucleated RBCs 0.0 0.0  WBC    nRBC 0.00 0.00 - 0.01 K/uL    Neutrophils % 55 32 - 75 %    Lymphocytes % 36 12 - 49 %    Monocytes % 7 5 - 13 %    Eosinophils % 1 0 - 7 %    Basophils % 1 0 - 1 %    Immature Granulocytes % 0 0 - 0.5 %    Neutrophils Absolute 3.4 1.8 - 8.0 K/UL    Lymphocytes Absolute 2.2 0.8 - 3.5 K/UL    Monocytes Absolute 0.4 0.0 - 1.0 K/UL    Eosinophils Absolute 0.1 0.0 - 0.4 K/UL    Basophils Absolute 0.0 0.0 - 0.1 K/UL    Immature Granulocytes Absolute 0.0 0.00 - 0.04 K/UL    Differential Type AUTOMATED     Comprehensive Metabolic Panel    Collection Time: 06/30/24  6:50 PM   Result Value Ref Range    Sodium 137 136 - 145 mmol/L    Potassium 3.5 3.5 - 5.1 mmol/L    Chloride 110 (H) 97 - 108 mmol/L    CO2 23 21 - 32 mmol/L    Anion Gap 4 (L) 5 - 15 mmol/L    Glucose 110 (H) 65 - 100 mg/dL    BUN 20 6 - 20 mg/dL    Creatinine 1.00 0.55 - 1.02 mg/dL    BUN/Creatinine Ratio 20 12 - 20      Est, Glom Filt Rate 74 >60 ml/min/1.73m2

## 2024-07-29 ENCOUNTER — TELEPHONE (OUTPATIENT)
Age: 39
End: 2024-07-29

## 2024-08-12 ENCOUNTER — TELEPHONE (OUTPATIENT)
Age: 39
End: 2024-08-12

## 2024-08-12 NOTE — TELEPHONE ENCOUNTER
[facility] scheduled [facility] for delivery, 8/14/2024.

## 2024-08-23 ENCOUNTER — TELEPHONE (OUTPATIENT)
Age: 39
End: 2024-08-23

## 2024-08-23 NOTE — TELEPHONE ENCOUNTER
Patient sent a message through [employer] on 8/22/2024 to cancel her Botox appointment scheduled for 8/23 stating \"Not available please stop making appointments for me and ask me\".

## 2024-10-03 ENCOUNTER — TELEPHONE (OUTPATIENT)
Age: 39
End: 2024-10-03

## 2024-10-03 NOTE — TELEPHONE ENCOUNTER
HIPAA Verified (if caller is someone other than patient): yes       Reason for Call: Requesting Botox appt    Reason for appointment:   Botox    Reason appointment not scheduled at time of call:   Nurse has to schedule    Requested Provider:   Priyank    Additional Notes (as needed):  Pt stated she had a discuss w/nurse and aptpt was not suppose to be cancel.        Message: (any additional details from patient/caller not covered above)          Level 1 Calls - attempted to reach practice? no     Reason Call Marked High Priority (if applicable):

## 2024-10-21 ENCOUNTER — TELEPHONE (OUTPATIENT)
Age: 39
End: 2024-10-21

## 2024-10-21 NOTE — TELEPHONE ENCOUNTER
HIPAA Verified (if caller is someone other than patient): N/A       Reason for Call: med refill    Medication Name:   Botox    Pharmacy (if different from pharmacy on file):   [facility]    Prescribing Provider:   Priyank    Last Office Visit (must be within last 12 months - if not, schedule appointment then send refill encounter):   03/24/23  (please refer to last message sent)     Is patient completely out of medication?           Message: (any additional details from patient/caller not covered above)  200 units        Level 1 Calls - attempted to reach practice? N/A     Reason Call Marked High Priority (if applicable):

## 2024-10-31 DIAGNOSIS — G43.109 MIGRAINE WITH AURA, NOT INTRACTABLE, WITHOUT STATUS MIGRAINOSUS: ICD-10-CM

## 2024-12-19 ENCOUNTER — OFFICE VISIT (OUTPATIENT)
Age: 39
End: 2024-12-19
Payer: OTHER GOVERNMENT

## 2024-12-19 DIAGNOSIS — G43.109 MIGRAINE WITH AURA, NOT INTRACTABLE, WITHOUT STATUS MIGRAINOSUS: ICD-10-CM

## 2024-12-19 PROCEDURE — 64615 CHEMODENERV MUSC MIGRAINE: CPT | Performed by: NURSE PRACTITIONER

## 2024-12-19 RX ORDER — ERENUMAB-AOOE 140 MG/ML
140 INJECTION, SOLUTION SUBCUTANEOUS
Qty: 1 ML | Refills: 3 | Status: SHIPPED | OUTPATIENT
Start: 2024-12-19

## 2024-12-19 NOTE — PROGRESS NOTES
OFFICE PROCEDURE PROGRESS NOTE        Chart reviewed for the following:   Wilian CLAY APRN - NP, have reviewed the History, Physical and updated the Allergic reactions for Kita Abdi     TIME OUT performed immediately prior to start of procedure:   Wilian CLAY APRN - NP, have performed the following reviews on Kita [place] prior to the start of the procedure:            * Patient was identified by name and date of birth   * Agreement on procedure being performed was verified  * Risks and Benefits explained to the patient  * Procedure site verified and marked as necessary  * Patient was positioned for comfort  * Consent was signed and verified     Time: 1500      Date of procedure: 12/19/2024    Procedure performed by:  RICKY Gaston NP    Provider assisted by: None    Patient assisted by: None    How tolerated by patient: tolerated the procedure well with no complications    Post Procedural Pain Scale: 2 - Hurts Little Bit    Comments: None    LOT: [id number]  EXP: 12/2026  PATIENT SUPPLIED           Botox Injection Note       Indication: patient has chronic recurrent migraine, has 7-10 less migraine days per month with botox injections    Procedure:   Botox concentration: 200 units in 4 ml of preservative-free normal saline.   31 sites injections, distribution as follow      Units/site  Sites Sides Subtotal    Procerus 5 1 1 5    5 1 2 10   Frontalis 5 2 2 20   Temporalis 5 4 2 40   Occipitalis 5 3 2 30   Upper cervical paraspinalis 5 2 2 20   Trapezius 5 3 2 30         200 units Botox were reconstituted, 155 units injected as above and the remainder was unavoidably wasted.     Patient tolerated procedure well.     _____________________________   WILIAN FLORES

## 2025-02-13 DIAGNOSIS — G43.109 MIGRAINE WITH AURA, NOT INTRACTABLE, WITHOUT STATUS MIGRAINOSUS: ICD-10-CM

## 2025-03-04 ENCOUNTER — TELEPHONE (OUTPATIENT)
Age: 40
End: 2025-03-04

## 2025-04-08 ENCOUNTER — TELEPHONE (OUTPATIENT)
Age: 40
End: 2025-04-08

## 2025-04-08 NOTE — TELEPHONE ENCOUNTER
Patient called and stated that she would like to make a NP appt. She stated that her referral was sent back in February. I informed her that I would put a message in for her and have the referral coordinator reach back out to her.     # [phone]

## 2025-04-17 ENCOUNTER — OFFICE VISIT (OUTPATIENT)
Age: 40
End: 2025-04-17
Payer: OTHER GOVERNMENT

## 2025-04-17 VITALS
HEIGHT: 68 IN | OXYGEN SATURATION: 97 % | WEIGHT: 232 LBS | SYSTOLIC BLOOD PRESSURE: 123 MMHG | RESPIRATION RATE: 18 BRPM | TEMPERATURE: 98.9 F | DIASTOLIC BLOOD PRESSURE: 88 MMHG | BODY MASS INDEX: 35.16 KG/M2

## 2025-04-17 DIAGNOSIS — D72.819 LEUKOPENIA, UNSPECIFIED TYPE: ICD-10-CM

## 2025-04-17 DIAGNOSIS — D64.9 NORMOCYTIC ANEMIA: ICD-10-CM

## 2025-04-17 DIAGNOSIS — D64.9 NORMOCYTIC ANEMIA: Primary | ICD-10-CM

## 2025-04-17 PROCEDURE — 99204 OFFICE O/P NEW MOD 45 MIN: CPT | Performed by: NURSE PRACTITIONER

## 2025-04-17 RX ORDER — MULTIVIT WITH MINERALS/LUTEIN
250 TABLET ORAL DAILY
COMMUNITY

## 2025-04-17 RX ORDER — DEXTROAMPHETAMINE SACCHARATE, AMPHETAMINE ASPARTATE MONOHYDRATE, DEXTROAMPHETAMINE SULFATE AND AMPHETAMINE SULFATE 5; 5; 5; 5 MG/1; MG/1; MG/1; MG/1
1 CAPSULE, EXTENDED RELEASE ORAL EVERY MORNING
COMMUNITY
Start: 2025-02-07

## 2025-04-17 RX ORDER — ADALIMUMAB 40MG/0.8ML
KIT SUBCUTANEOUS
COMMUNITY
Start: 2025-04-07

## 2025-04-17 RX ORDER — METHOCARBAMOL 500 MG/1
TABLET, FILM COATED ORAL
COMMUNITY
Start: 2025-04-09

## 2025-04-17 RX ORDER — FOLIC ACID 1 MG/1
1 TABLET ORAL DAILY
COMMUNITY

## 2025-04-17 NOTE — PROGRESS NOTES
medical record.    Past Medical History:   Diagnosis Date    Hypercholesterolemia      Past Surgical History:   Procedure Laterality Date    ORTHOPEDIC SURGERY      left elbow    SHOULDER ARTHROSCOPY Right      Social History     Socioeconomic History    Marital status: Single   Tobacco Use    Smoking status: Never    Smokeless tobacco: Never   Vaping Use    Vaping status: Never Used   Substance and Sexual Activity    Alcohol use: Not Currently    Drug use: Never    Sexual activity: Not Currently     Current Outpatient Medications   Medication Sig Dispense Refill    Onabotulinumtoxin A (BOTOX) 200 units injection Inject 155 units into 31 FDA approved sites in head, face, neck, every 3 months for chronic migraine. 1 each 4    Erenumab-aooe (AIMOVIG) 140 MG/ML SOAJ Inject 140 mg into the skin every 30 days 1 mL 3    naproxen (NAPROSYN) 500 MG tablet Take 1 tablet by mouth 2 times daily as needed for Pain 60 tablet 0    ondansetron (ZOFRAN-ODT) 4 MG disintegrating tablet Take 1 tablet by mouth every 8 hours as needed for Nausea or Vomiting 30 tablet 5    Rimegepant Sulfate (NURTEC) 75 MG TBDP 1 tablet at HA onset PRN. Max 1 tablet in 24 hours. 8 tablet 3    acetaminophen (TYLENOL) 325 MG tablet Take 2 tablets by mouth every 6 hours as needed      cabergoline (DOSTINEX) 0.5 MG tablet Take 1 tablet by mouth Twice a Week      clobetasol (TEMOVATE) 0.05 % ointment Apply topically 2 times daily      diclofenac sodium (VOLTAREN) 1 % GEL Apply topically 4 times daily      diphenhydrAMINE (BENADRYL) 25 MG capsule Take 1 capsule by mouth every 6 hours as needed      fluocinolone (DERMA-SMOOTHE) 0.01 % OIL external oil Apply topically      ketoconazole (NIZORAL) 2 % shampoo Apply topically daily as needed      ketoconazole (NIZORAL) 2 % cream Apply topically daily      levothyroxine (SYNTHROID) 88 MCG tablet Take by mouth every morning (before breakfast)      SUMAtriptan (IMITREX) 100 MG tablet Take 1 tablet by mouth once as

## 2025-04-18 LAB
ALBUMIN SERPL-MCNC: 3.6 G/DL (ref 3.5–5)
ALBUMIN/GLOB SERPL: 0.9 (ref 1.1–2.2)
ALP SERPL-CCNC: 67 U/L (ref 45–117)
ALT SERPL-CCNC: 20 U/L (ref 12–78)
ANION GAP SERPL CALC-SCNC: 7 MMOL/L (ref 2–12)
AST SERPL-CCNC: 19 U/L (ref 15–37)
BASOPHILS # BLD: 0.03 K/UL (ref 0–0.1)
BASOPHILS NFR BLD: 0.5 % (ref 0–1)
BILIRUB SERPL-MCNC: 0.1 MG/DL (ref 0.2–1)
BUN SERPL-MCNC: 12 MG/DL (ref 6–20)
BUN/CREAT SERPL: 13 (ref 12–20)
CALCIUM SERPL-MCNC: 9.3 MG/DL (ref 8.5–10.1)
CHLORIDE SERPL-SCNC: 107 MMOL/L (ref 97–108)
CO2 SERPL-SCNC: 26 MMOL/L (ref 21–32)
CREAT SERPL-MCNC: 0.91 MG/DL (ref 0.55–1.02)
DIFFERENTIAL METHOD BLD: ABNORMAL
EOSINOPHIL # BLD: 0.04 K/UL (ref 0–0.4)
EOSINOPHIL NFR BLD: 0.7 % (ref 0–7)
ERYTHROCYTE [DISTWIDTH] IN BLOOD BY AUTOMATED COUNT: 14.8 % (ref 11.5–14.5)
FERRITIN SERPL-MCNC: 5 NG/ML (ref 26–388)
FLOW CYTOMETRY: NORMAL
FOLATE SERPL-MCNC: 8.2 NG/ML (ref 5–21)
GLOBULIN SER CALC-MCNC: 3.8 G/DL (ref 2–4)
GLUCOSE SERPL-MCNC: 91 MG/DL (ref 65–100)
HCT VFR BLD AUTO: 33.6 % (ref 35–47)
HGB BLD-MCNC: 10.3 G/DL (ref 11.5–16)
IMM GRANULOCYTES # BLD AUTO: 0.02 K/UL (ref 0–0.04)
IMM GRANULOCYTES NFR BLD AUTO: 0.4 % (ref 0–0.5)
IRON SATN MFR SERPL: 7 % (ref 20–50)
IRON SERPL-MCNC: 27 UG/DL (ref 35–150)
LYMPHOCYTES # BLD: 1.62 K/UL (ref 0.8–3.5)
LYMPHOCYTES NFR BLD: 29.5 % (ref 12–49)
MCH RBC QN AUTO: 25.8 PG (ref 26–34)
MCHC RBC AUTO-ENTMCNC: 30.7 G/DL (ref 30–36.5)
MCV RBC AUTO: 84.2 FL (ref 80–99)
MONOCYTES # BLD: 0.47 K/UL (ref 0–1)
MONOCYTES NFR BLD: 8.6 % (ref 5–13)
NEUTS SEG # BLD: 3.31 K/UL (ref 1.8–8)
NEUTS SEG NFR BLD: 60.3 % (ref 32–75)
NRBC # BLD: 0 K/UL (ref 0–0.01)
NRBC BLD-RTO: 0 PER 100 WBC
PLATELET # BLD AUTO: 320 K/UL (ref 150–400)
PMV BLD AUTO: 10.8 FL (ref 8.9–12.9)
POTASSIUM SERPL-SCNC: 4.1 MMOL/L (ref 3.5–5.1)
PROT SERPL-MCNC: 7.4 G/DL (ref 6.4–8.2)
RBC # BLD AUTO: 3.99 M/UL (ref 3.8–5.2)
SODIUM SERPL-SCNC: 140 MMOL/L (ref 136–145)
TIBC SERPL-MCNC: 387 UG/DL (ref 250–450)
VIT B12 SERPL-MCNC: 307 PG/ML (ref 193–986)
WBC # BLD AUTO: 5.5 K/UL (ref 3.6–11)

## 2025-04-28 ENCOUNTER — RESULTS FOLLOW-UP (OUTPATIENT)
Age: 40
End: 2025-04-28

## 2025-05-06 ENCOUNTER — OFFICE VISIT (OUTPATIENT)
Age: 40
End: 2025-05-06
Payer: OTHER GOVERNMENT

## 2025-05-06 VITALS
OXYGEN SATURATION: 98 % | WEIGHT: 235 LBS | SYSTOLIC BLOOD PRESSURE: 115 MMHG | DIASTOLIC BLOOD PRESSURE: 80 MMHG | RESPIRATION RATE: 18 BRPM | HEIGHT: 68 IN | TEMPERATURE: 98.1 F | BODY MASS INDEX: 35.61 KG/M2 | HEART RATE: 80 BPM

## 2025-05-06 DIAGNOSIS — D50.8 IRON DEFICIENCY ANEMIA SECONDARY TO INADEQUATE DIETARY IRON INTAKE: Primary | ICD-10-CM

## 2025-05-06 DIAGNOSIS — D72.819 LEUKOPENIA, UNSPECIFIED TYPE: ICD-10-CM

## 2025-05-06 PROCEDURE — 99214 OFFICE O/P EST MOD 30 MIN: CPT | Performed by: STUDENT IN AN ORGANIZED HEALTH CARE EDUCATION/TRAINING PROGRAM

## 2025-05-06 RX ORDER — FERROUS SULFATE 325(65) MG
325 TABLET, DELAYED RELEASE (ENTERIC COATED) ORAL
Qty: 90 TABLET | Refills: 2 | Status: SHIPPED | OUTPATIENT
Start: 2025-05-06 | End: 2025-06-05

## 2025-05-06 RX ORDER — IBUPROFEN 800 MG/1
TABLET, FILM COATED ORAL
COMMUNITY
Start: 2025-05-02

## 2025-05-06 NOTE — PROGRESS NOTES
Kita Patton is a 39 y.o. female who presents for follow up of   Chief Complaint   Patient presents with    Follow-up    Anemia       The patient reports no new clinical symptoms or new complaints since last clinic evaluation. Here for a lab follow up. She reports feeling ok farrell some mild pain in back 7/10.     She reports going to urgent care last week for back pain     No interval surgery or procedures reported    No reported new medication changes reported       Medications reviewed with the patient, and chart updated to reflect changes.

## 2025-05-06 NOTE — PROGRESS NOTES
[facility] [place] [facility]  [address], [address]  [place] [address]  W: [phone]  F: [phone]    Hematology/Oncology Office Note:     Reason for Visit:   Kita Patton is a 39 y.o. female who is seen today for evaluation of anemia, referred by her PCP.    Hematology/Oncology Treatment History:   Hematological/Oncological Diagnosis: Anemia, leukopenia   Date of diagnosis: 2/2024  Treatment Course: pending    History of Present Illness:   The patient (or guardian, if applicable) and other individuals in attendance with the patient were advised that Artificial Intelligence will be utilized during this visit to record, process the conversation to generate a clinical note, and support improvement of the AI technology. The patient (or guardian, if applicable) and other individuals in attendance at the appointment consented to the use of AI, including the recording.      History of Present Illness  The patient presents for evaluation of anemia and leukopenia.    A history of low hemoglobin levels has been previously identified but not addressed. No abnormal bleeding or bruising is reported. Fatigue is present, but there are no night sweats or unintentional weight loss. No recent or recurrent infections are noted. Shortness of breath occurs both at rest and during physical activity, particularly when ascending stairs or engaging in exercise. No lumps or bumps in the neck have been noticed. Monthly ankle swelling is experienced. Occasionally, she wakes up with wet clothes due to sweating, which is attributed to feeling cold and using a heater during sleep. A heater is also used daily in her office.    She has a history of chronic back pain and is scheduled to start Humira treatment for psoriasis.    Review of systems was obtained and pertinent findings reviewed above. Past medical history, social history, family history, medications, and allergies are located in the electronic

## 2025-06-06 ENCOUNTER — OFFICE VISIT (OUTPATIENT)
Age: 40
End: 2025-06-06
Payer: OTHER GOVERNMENT

## 2025-06-06 DIAGNOSIS — G43.109 MIGRAINE WITH AURA, NOT INTRACTABLE, WITHOUT STATUS MIGRAINOSUS: ICD-10-CM

## 2025-06-06 PROCEDURE — 64615 CHEMODENERV MUSC MIGRAINE: CPT | Performed by: NURSE PRACTITIONER

## 2025-06-06 RX ORDER — ERENUMAB-AOOE 140 MG/ML
140 INJECTION, SOLUTION SUBCUTANEOUS
Qty: 1 ML | Refills: 3 | Status: ACTIVE | OUTPATIENT
Start: 2025-06-06

## 2025-06-06 NOTE — PROGRESS NOTES
OFFICE PROCEDURE PROGRESS NOTE      Chief Complaint   Patient presents with    Botox Injection     Patient states they are having    6 migraines per month, and patient has   70 % in reduction of migraines since the start of botox.               Chart reviewed for the following:   Isa CLAY DNP, have reviewed the History, Physical and updated the Allergic reactions for Kita [place]     TIME OUT performed immediately prior to start of procedure:   Isa CLAY DNP, have performed the following reviews on Kita [place] prior to the start of the procedure:            * Patient was identified by name and date of birth   * Agreement on procedure being performed was verified  * Risks and Benefits explained to the patient  * Procedure site verified and marked as necessary  * Patient was positioned for comfort  * Consent was signed and verified     Time: 0940      Date of procedure: 6/6/2025    Procedure performed by:  Isa Yost DNP    Provider assisted by: None    Patient assisted by: None    How tolerated by patient: tolerated the procedure well with no complications    Post Procedural Pain Scale: 2 - Hurts Little Bit    Comments: None    LOT:[id number]  EXP: 5/2027  Patient supplied            Botox Injection Note       Indication: patient has chronic recurrent migraine, has 7-10 less migraine days per month with botox injections    Procedure:   Botox concentration: 200 units in 4 ml of preservative-free normal saline.   31 sites injections, distribution as follow      Units/site  Sites Sides Subtotal    Procerus 5 1 1 5    5 1 2 10   Frontalis 5 2 2 20   Temporalis 5 4 2 40   Occipitalis 5 3 2 30   Upper cervical paraspinalis 5 2 2 20   Trapezius 5 3 2 30         200 units Botox were reconstituted, 155 units injected as above and the remainder was unavoidably wasted.     Patient tolerated procedure well.     _____________________________   ISA FLORES

## 2025-07-18 ENCOUNTER — TELEPHONE (OUTPATIENT)
Age: 40
End: 2025-07-18

## 2025-07-18 NOTE — TELEPHONE ENCOUNTER
Jose Alberto is calling from [employer] to let us know that the botox prior authorization  yesterday 2025 and a new prior authorization needs to be submitted/   Ph: [phone]

## 2025-07-23 ENCOUNTER — HOSPITAL ENCOUNTER (EMERGENCY)
Facility: HOSPITAL | Age: 40
Discharge: HOME | End: 2025-07-23
Attending: EMERGENCY MEDICINE
Payer: OTHER GOVERNMENT

## 2025-07-23 VITALS
DIASTOLIC BLOOD PRESSURE: 83 MMHG | HEIGHT: 68 IN | WEIGHT: 230 LBS | SYSTOLIC BLOOD PRESSURE: 118 MMHG | HEART RATE: 89 BPM | TEMPERATURE: 98.1 F | OXYGEN SATURATION: 100 % | RESPIRATION RATE: 18 BRPM | BODY MASS INDEX: 34.86 KG/M2

## 2025-07-23 DIAGNOSIS — K13.70 UNSPECIFIED LESIONS OF ORAL MUCOSA: Primary | ICD-10-CM

## 2025-07-23 PROCEDURE — 99282 EMERGENCY DEPT VISIT SF MDM: CPT

## 2025-07-23 ASSESSMENT — LIFESTYLE VARIABLES
HOW MANY STANDARD DRINKS CONTAINING ALCOHOL DO YOU HAVE ON A TYPICAL DAY: PATIENT DOES NOT DRINK
HOW OFTEN DO YOU HAVE A DRINK CONTAINING ALCOHOL: NEVER

## 2025-07-23 ASSESSMENT — PAIN - FUNCTIONAL ASSESSMENT: PAIN_FUNCTIONAL_ASSESSMENT: 0-10

## 2025-07-23 ASSESSMENT — PAIN DESCRIPTION - LOCATION: LOCATION: MOUTH

## 2025-07-23 ASSESSMENT — PAIN SCALES - GENERAL: PAINLEVEL_OUTOF10: 2

## 2025-07-23 NOTE — ED PROVIDER NOTES
[facility] EMERGENCY DEPT  EMERGENCY DEPARTMENT HISTORY AND PHYSICAL EXAM      Date of evaluation: 7/23/2025  Patient Name: Kita Patton  Birthdate 1985  MRN: [id number]  ED Provider: Wilian Gutierrez DO   Note Started: 3:18 PM EDT 7/23/25    HISTORY OF PRESENT ILLNESS     Chief Complaint   Patient presents with    Mouth Injury     History Provided By: patient    HPI: 39-year-old female with history of hyperlipidemia who presents with mouth pain.  States that she bit the inside of her left cheek a few days ago while doing PT.  She is in the .  States that since then she has been having issues where she keeps biting the same area and there is a skin flap or oral mucosa hanging off.  It is swollen there as well.  She also noticed some spots in the back of her tongue.  She would just like to be checked out to make sure nothing serious is going on.  Denies sore throat or fevers.  No other symptoms.    PAST MEDICAL HISTORY   Past Medical History:  Past Medical History:   Diagnosis Date    Hypercholesterolemia        Past Surgical History:  Past Surgical History:   Procedure Laterality Date    ORTHOPEDIC SURGERY      left elbow    SHOULDER ARTHROSCOPY Right        Family History:  Family History   Problem Relation Age of Onset    Cancer Maternal Grandfather     Cancer Maternal Grandmother        Social History:  Social History     Tobacco Use    Smoking status: Never    Smokeless tobacco: Never   Vaping Use    Vaping status: Never Used   Substance Use Topics    Alcohol use: Not Currently    Drug use: Never       Allergies:  Allergies   Allergen Reactions    Influenza Vaccines Hives     secondary to egg allergy    Morphine Headaches, Hives and Other (See Comments)    Adhesive Tape      Other reaction(s): Unknown (comments)    Egg Solids, Whole      Other reaction(s): Unknown (comments)    Influenza Vac A&B Sa Adj Quad Other (See Comments)    Anthrax Vaccine Hives    Contrast [Barium-Containing Compounds] Rash

## 2025-07-23 NOTE — ED TRIAGE NOTES
Pt reports she was doing PT on Monday and she bit the left side of her cheek. She reports there is a bump where she bit it and it is getting in the way of her teeth.

## 2025-07-30 ENCOUNTER — TELEPHONE (OUTPATIENT)
Age: 40
End: 2025-07-30

## 2025-07-31 DIAGNOSIS — D50.8 IRON DEFICIENCY ANEMIA SECONDARY TO INADEQUATE DIETARY IRON INTAKE: ICD-10-CM

## 2025-07-31 DIAGNOSIS — D72.819 LEUKOPENIA, UNSPECIFIED TYPE: ICD-10-CM

## 2025-07-31 DIAGNOSIS — D64.9 NORMOCYTIC ANEMIA: ICD-10-CM

## 2025-07-31 DIAGNOSIS — D50.8 IRON DEFICIENCY ANEMIA SECONDARY TO INADEQUATE DIETARY IRON INTAKE: Primary | ICD-10-CM

## 2025-08-02 LAB
ALBUMIN SERPL-MCNC: 4.3 G/DL (ref 3.9–4.9)
ALP SERPL-CCNC: 62 IU/L (ref 44–121)
ALT SERPL-CCNC: 8 IU/L (ref 0–32)
AST SERPL-CCNC: 20 IU/L (ref 0–40)
BASOPHILS # BLD AUTO: 0 X10E3/UL (ref 0–0.2)
BASOPHILS NFR BLD AUTO: 1 %
BILIRUB SERPL-MCNC: <0.2 MG/DL (ref 0–1.2)
BUN SERPL-MCNC: 11 MG/DL (ref 6–20)
BUN/CREAT SERPL: 11 (ref 9–23)
CALCIUM SERPL-MCNC: 9.4 MG/DL (ref 8.7–10.2)
CHLORIDE SERPL-SCNC: 108 MMOL/L (ref 96–106)
CO2 SERPL-SCNC: 20 MMOL/L (ref 20–29)
CREAT SERPL-MCNC: 0.96 MG/DL (ref 0.57–1)
EGFRCR SERPLBLD CKD-EPI 2021: 77 ML/MIN/1.73
EOSINOPHIL # BLD AUTO: 0 X10E3/UL (ref 0–0.4)
EOSINOPHIL NFR BLD AUTO: 1 %
ERYTHROCYTE [DISTWIDTH] IN BLOOD BY AUTOMATED COUNT: 14.4 % (ref 11.7–15.4)
FERRITIN SERPL-MCNC: 22 NG/ML (ref 15–150)
GLOBULIN SER CALC-MCNC: 3.2 G/DL (ref 1.5–4.5)
GLUCOSE SERPL-MCNC: 78 MG/DL (ref 70–99)
HCT VFR BLD AUTO: 35 % (ref 34–46.6)
HGB BLD-MCNC: 10.8 G/DL (ref 11.1–15.9)
IMM GRANULOCYTES # BLD AUTO: 0 X10E3/UL (ref 0–0.1)
IMM GRANULOCYTES NFR BLD AUTO: 0 %
IRON SATN MFR SERPL: 16 % (ref 15–55)
IRON SERPL-MCNC: 54 UG/DL (ref 27–159)
LYMPHOCYTES # BLD AUTO: 2 X10E3/UL (ref 0.7–3.1)
LYMPHOCYTES NFR BLD AUTO: 41 %
MCH RBC QN AUTO: 26.3 PG (ref 26.6–33)
MCHC RBC AUTO-ENTMCNC: 30.9 G/DL (ref 31.5–35.7)
MCV RBC AUTO: 85 FL (ref 79–97)
MONOCYTES # BLD AUTO: 0.4 X10E3/UL (ref 0.1–0.9)
MONOCYTES NFR BLD AUTO: 8 %
NEUTROPHILS # BLD AUTO: 2.5 X10E3/UL (ref 1.4–7)
NEUTROPHILS NFR BLD AUTO: 49 %
PLATELET # BLD AUTO: 298 X10E3/UL (ref 150–450)
POTASSIUM SERPL-SCNC: 4.2 MMOL/L (ref 3.5–5.2)
PROT SERPL-MCNC: 7.5 G/DL (ref 6–8.5)
RBC # BLD AUTO: 4.1 X10E6/UL (ref 3.77–5.28)
SODIUM SERPL-SCNC: 143 MMOL/L (ref 134–144)
TIBC SERPL-MCNC: 343 UG/DL (ref 250–450)
UIBC SERPL-MCNC: 289 UG/DL (ref 131–425)
WBC # BLD AUTO: 5 X10E3/UL (ref 3.4–10.8)

## 2025-08-06 ENCOUNTER — PATIENT MESSAGE (OUTPATIENT)
Age: 40
End: 2025-08-06

## 2025-08-06 ENCOUNTER — OFFICE VISIT (OUTPATIENT)
Age: 40
End: 2025-08-06
Payer: OTHER GOVERNMENT

## 2025-08-06 VITALS
BODY MASS INDEX: 35.16 KG/M2 | RESPIRATION RATE: 16 BRPM | WEIGHT: 232 LBS | OXYGEN SATURATION: 90 % | SYSTOLIC BLOOD PRESSURE: 114 MMHG | HEIGHT: 68 IN | TEMPERATURE: 98.3 F | DIASTOLIC BLOOD PRESSURE: 84 MMHG | HEART RATE: 93 BPM

## 2025-08-06 DIAGNOSIS — D72.819 LEUKOPENIA, UNSPECIFIED TYPE: ICD-10-CM

## 2025-08-06 DIAGNOSIS — D64.9 NORMOCYTIC ANEMIA: ICD-10-CM

## 2025-08-06 DIAGNOSIS — G43.711 CHRONIC MIGRAINE WITHOUT AURA, INTRACTABLE, WITH STATUS MIGRAINOSUS: Primary | ICD-10-CM

## 2025-08-06 DIAGNOSIS — D50.8 IRON DEFICIENCY ANEMIA SECONDARY TO INADEQUATE DIETARY IRON INTAKE: Primary | ICD-10-CM

## 2025-08-06 PROCEDURE — 99214 OFFICE O/P EST MOD 30 MIN: CPT | Performed by: NURSE PRACTITIONER

## 2025-08-06 RX ORDER — BENZOCAINE/MENTHOL 6 MG-10 MG
LOZENGE MUCOUS MEMBRANE
COMMUNITY

## 2025-08-06 RX ORDER — DIPHENHYDRAMINE HYDROCHLORIDE 50 MG/ML
50 INJECTION, SOLUTION INTRAMUSCULAR; INTRAVENOUS
OUTPATIENT
Start: 2025-08-13

## 2025-08-06 RX ORDER — FAMOTIDINE 10 MG/ML
20 INJECTION, SOLUTION INTRAVENOUS
OUTPATIENT
Start: 2025-08-13

## 2025-08-06 RX ORDER — EPINEPHRINE 0.3 MG/.3ML
INJECTION SUBCUTANEOUS
COMMUNITY
Start: 2025-07-29

## 2025-08-06 RX ORDER — ALBUTEROL SULFATE 90 UG/1
4 INHALANT RESPIRATORY (INHALATION) PRN
OUTPATIENT
Start: 2025-08-13

## 2025-08-06 RX ORDER — GUANFACINE 1 MG/1
1 TABLET ORAL
COMMUNITY
Start: 2025-02-07

## 2025-08-06 RX ORDER — SODIUM CHLORIDE 0.9 % (FLUSH) 0.9 %
5-40 SYRINGE (ML) INJECTION PRN
OUTPATIENT
Start: 2025-08-13

## 2025-08-06 RX ORDER — EMOLLIENT BASE
CREAM (GRAM) TOPICAL
COMMUNITY
Start: 2025-07-29

## 2025-08-06 RX ORDER — SODIUM CHLORIDE 9 MG/ML
5-250 INJECTION, SOLUTION INTRAVENOUS PRN
OUTPATIENT
Start: 2025-08-13

## 2025-08-06 RX ORDER — ALBUTEROL SULFATE 90 UG/1
INHALANT RESPIRATORY (INHALATION)
COMMUNITY
Start: 2024-11-12

## 2025-08-06 RX ORDER — ACETAMINOPHEN 325 MG/1
650 TABLET ORAL
OUTPATIENT
Start: 2025-08-13

## 2025-08-06 RX ORDER — CHLORHEXIDINE GLUCONATE 1.2 MG/ML
0.02 RINSE ORAL
COMMUNITY
Start: 2025-07-24

## 2025-08-06 RX ORDER — SEMAGLUTIDE 0.5 MG/.5ML
INJECTION, SOLUTION SUBCUTANEOUS
COMMUNITY
Start: 2025-07-22

## 2025-08-06 RX ORDER — TRIAMCINOLONE ACETONIDE 1 MG/G
CREAM TOPICAL
COMMUNITY
Start: 2025-07-29 | End: 2025-09-29

## 2025-08-06 RX ORDER — ONDANSETRON 2 MG/ML
8 INJECTION INTRAMUSCULAR; INTRAVENOUS
OUTPATIENT
Start: 2025-08-13

## 2025-08-06 RX ORDER — UBROGEPANT 100 MG/1
TABLET ORAL
Qty: 16 TABLET | Refills: 4 | Status: SHIPPED | OUTPATIENT
Start: 2025-08-06

## 2025-08-06 RX ORDER — FAMOTIDINE 20 MG/1
TABLET, FILM COATED ORAL
COMMUNITY
Start: 2025-07-01

## 2025-08-06 RX ORDER — CYCLOBENZAPRINE HCL 10 MG
TABLET ORAL
COMMUNITY
Start: 2025-05-19

## 2025-08-06 RX ORDER — HEPARIN SODIUM (PORCINE) LOCK FLUSH IV SOLN 100 UNIT/ML 100 UNIT/ML
500 SOLUTION INTRAVENOUS PRN
OUTPATIENT
Start: 2025-08-13

## 2025-08-06 RX ORDER — CARBOXYMETHYLCELLULOSE SODIUM 5 MG/ML
SOLUTION/ DROPS OPHTHALMIC
COMMUNITY
Start: 2025-07-23

## 2025-08-06 RX ORDER — ASPIRIN 81 MG
5 TABLET, DELAYED RELEASE (ENTERIC COATED) ORAL
COMMUNITY
Start: 2025-08-05 | End: 2025-08-12

## 2025-08-06 RX ORDER — EPINEPHRINE 1 MG/ML
0.3 INJECTION, SOLUTION, CONCENTRATE INTRAVENOUS PRN
OUTPATIENT
Start: 2025-08-13

## 2025-08-06 RX ORDER — SODIUM CHLORIDE 9 MG/ML
INJECTION, SOLUTION INTRAVENOUS PRN
OUTPATIENT
Start: 2025-08-13

## 2025-08-06 ASSESSMENT — PATIENT HEALTH QUESTIONNAIRE - PHQ9
SUM OF ALL RESPONSES TO PHQ QUESTIONS 1-9: 0
2. FEELING DOWN, DEPRESSED OR HOPELESS: NOT AT ALL
SUM OF ALL RESPONSES TO PHQ QUESTIONS 1-9: 0
1. LITTLE INTEREST OR PLEASURE IN DOING THINGS: NOT AT ALL

## 2025-08-11 ENCOUNTER — TRANSCRIBE ORDERS (OUTPATIENT)
Facility: HOSPITAL | Age: 40
End: 2025-08-11

## 2025-08-11 DIAGNOSIS — D35.2 MICROPROLACTINOMA (HCC): Primary | ICD-10-CM

## 2025-08-12 DIAGNOSIS — G43.109 MIGRAINE WITH AURA, NOT INTRACTABLE, WITHOUT STATUS MIGRAINOSUS: ICD-10-CM

## 2025-08-12 RX ORDER — ERENUMAB-AOOE 140 MG/ML
140 INJECTION, SOLUTION SUBCUTANEOUS
Qty: 1 ML | Refills: 3 | Status: ACTIVE | OUTPATIENT
Start: 2025-08-12

## 2025-08-13 ENCOUNTER — TELEPHONE (OUTPATIENT)
Age: 40
End: 2025-08-13

## 2025-08-14 ENCOUNTER — TRANSCRIBE ORDERS (OUTPATIENT)
Facility: HOSPITAL | Age: 40
End: 2025-08-14

## 2025-08-14 DIAGNOSIS — D35.2 MACROPROLACTINOMA (HCC): Primary | ICD-10-CM

## 2025-08-19 ENCOUNTER — OFFICE VISIT (OUTPATIENT)
Age: 40
End: 2025-08-19
Payer: OTHER GOVERNMENT

## 2025-08-19 VITALS
OXYGEN SATURATION: 98 % | HEART RATE: 98 BPM | RESPIRATION RATE: 18 BRPM | DIASTOLIC BLOOD PRESSURE: 74 MMHG | SYSTOLIC BLOOD PRESSURE: 114 MMHG

## 2025-08-19 DIAGNOSIS — G43.711 CHRONIC MIGRAINE WITHOUT AURA, INTRACTABLE, WITH STATUS MIGRAINOSUS: ICD-10-CM

## 2025-08-19 PROCEDURE — 99214 OFFICE O/P EST MOD 30 MIN: CPT | Performed by: NURSE PRACTITIONER

## 2025-08-19 RX ORDER — UBROGEPANT 100 MG/1
TABLET ORAL
Qty: 16 TABLET | Refills: 4 | Status: ACTIVE | OUTPATIENT
Start: 2025-08-19

## 2025-08-21 ENCOUNTER — HOSPITAL ENCOUNTER (OUTPATIENT)
Facility: HOSPITAL | Age: 40
Discharge: HOME | End: 2025-08-24
Payer: OTHER GOVERNMENT

## 2025-08-21 DIAGNOSIS — D35.2 MACROPROLACTINOMA (HCC): ICD-10-CM

## 2025-08-21 PROCEDURE — 70551 MRI BRAIN STEM W/O DYE: CPT

## 2025-08-22 ENCOUNTER — HOSPITAL ENCOUNTER (OUTPATIENT)
Facility: HOSPITAL | Age: 40
Setting detail: INFUSION SERIES
Discharge: HOME | End: 2025-08-22
Payer: OTHER GOVERNMENT

## 2025-08-22 VITALS
TEMPERATURE: 98.4 F | RESPIRATION RATE: 16 BRPM | BODY MASS INDEX: 34.73 KG/M2 | DIASTOLIC BLOOD PRESSURE: 83 MMHG | HEART RATE: 92 BPM | WEIGHT: 228.4 LBS | SYSTOLIC BLOOD PRESSURE: 125 MMHG | OXYGEN SATURATION: 96 %

## 2025-08-22 DIAGNOSIS — D50.8 IRON DEFICIENCY ANEMIA SECONDARY TO INADEQUATE DIETARY IRON INTAKE: Primary | ICD-10-CM

## 2025-08-22 PROCEDURE — 6360000002 HC RX W HCPCS: Performed by: NURSE PRACTITIONER

## 2025-08-22 PROCEDURE — 96365 THER/PROPH/DIAG IV INF INIT: CPT

## 2025-08-22 PROCEDURE — 2500000003 HC RX 250 WO HCPCS: Performed by: NURSE PRACTITIONER

## 2025-08-22 PROCEDURE — 2580000003 HC RX 258: Performed by: NURSE PRACTITIONER

## 2025-08-22 RX ORDER — DIPHENHYDRAMINE HYDROCHLORIDE 50 MG/ML
50 INJECTION, SOLUTION INTRAMUSCULAR; INTRAVENOUS
OUTPATIENT
Start: 2025-08-29

## 2025-08-22 RX ORDER — SODIUM CHLORIDE 0.9 % (FLUSH) 0.9 %
5-40 SYRINGE (ML) INJECTION PRN
OUTPATIENT
Start: 2025-08-29

## 2025-08-22 RX ORDER — ACETAMINOPHEN 325 MG/1
650 TABLET ORAL
OUTPATIENT
Start: 2025-08-29

## 2025-08-22 RX ORDER — SODIUM CHLORIDE 9 MG/ML
5-250 INJECTION, SOLUTION INTRAVENOUS PRN
OUTPATIENT
Start: 2025-08-29

## 2025-08-22 RX ORDER — EPINEPHRINE 1 MG/ML
0.3 INJECTION, SOLUTION INTRAMUSCULAR; SUBCUTANEOUS PRN
OUTPATIENT
Start: 2025-08-29

## 2025-08-22 RX ORDER — ALBUTEROL SULFATE 90 UG/1
4 INHALANT RESPIRATORY (INHALATION) PRN
OUTPATIENT
Start: 2025-08-29

## 2025-08-22 RX ORDER — ONDANSETRON 2 MG/ML
8 INJECTION INTRAMUSCULAR; INTRAVENOUS
OUTPATIENT
Start: 2025-08-29

## 2025-08-22 RX ORDER — HEPARIN 100 UNIT/ML
500 SYRINGE INTRAVENOUS PRN
OUTPATIENT
Start: 2025-08-29

## 2025-08-22 RX ORDER — SODIUM CHLORIDE 0.9 % (FLUSH) 0.9 %
5-40 SYRINGE (ML) INJECTION PRN
Status: DISCONTINUED | OUTPATIENT
Start: 2025-08-22 | End: 2025-08-23 | Stop reason: HOSPADM

## 2025-08-22 RX ORDER — SODIUM CHLORIDE 9 MG/ML
INJECTION, SOLUTION INTRAVENOUS PRN
OUTPATIENT
Start: 2025-08-29

## 2025-08-22 RX ORDER — SODIUM CHLORIDE 9 MG/ML
5-250 INJECTION, SOLUTION INTRAVENOUS PRN
Status: DISCONTINUED | OUTPATIENT
Start: 2025-08-22 | End: 2025-08-23 | Stop reason: HOSPADM

## 2025-08-22 RX ADMIN — SODIUM CHLORIDE, PRESERVATIVE FREE 10 ML: 5 INJECTION INTRAVENOUS at 14:07

## 2025-08-22 RX ADMIN — SODIUM CHLORIDE, PRESERVATIVE FREE 10 ML: 5 INJECTION INTRAVENOUS at 13:20

## 2025-08-22 RX ADMIN — FERRIC CARBOXYMALTOSE INJECTION 750 MG: 50 INJECTION, SOLUTION INTRAVENOUS at 13:47

## 2025-09-02 ENCOUNTER — HOSPITAL ENCOUNTER (OUTPATIENT)
Facility: HOSPITAL | Age: 40
Setting detail: INFUSION SERIES
Discharge: HOME | End: 2025-09-02
Payer: OTHER GOVERNMENT

## 2025-09-02 VITALS
OXYGEN SATURATION: 95 % | SYSTOLIC BLOOD PRESSURE: 103 MMHG | RESPIRATION RATE: 15 BRPM | HEART RATE: 89 BPM | DIASTOLIC BLOOD PRESSURE: 63 MMHG | TEMPERATURE: 98 F

## 2025-09-02 DIAGNOSIS — D50.8 IRON DEFICIENCY ANEMIA SECONDARY TO INADEQUATE DIETARY IRON INTAKE: Primary | ICD-10-CM

## 2025-09-02 PROCEDURE — 2580000003 HC RX 258: Performed by: NURSE PRACTITIONER

## 2025-09-02 PROCEDURE — 96365 THER/PROPH/DIAG IV INF INIT: CPT

## 2025-09-02 PROCEDURE — 6360000002 HC RX W HCPCS: Performed by: NURSE PRACTITIONER

## 2025-09-02 PROCEDURE — 2500000003 HC RX 250 WO HCPCS: Performed by: NURSE PRACTITIONER

## 2025-09-02 RX ORDER — SODIUM CHLORIDE 0.9 % (FLUSH) 0.9 %
5-40 SYRINGE (ML) INJECTION PRN
Status: DISCONTINUED | OUTPATIENT
Start: 2025-09-02 | End: 2025-09-03 | Stop reason: HOSPADM

## 2025-09-02 RX ORDER — TIRZEPATIDE 2.5 MG/.5ML
2.5 INJECTION, SOLUTION SUBCUTANEOUS WEEKLY
COMMUNITY
End: 2025-09-02

## 2025-09-02 RX ORDER — SODIUM CHLORIDE 9 MG/ML
5-250 INJECTION, SOLUTION INTRAVENOUS PRN
OUTPATIENT
Start: 2025-09-05

## 2025-09-02 RX ORDER — HEPARIN 100 UNIT/ML
500 SYRINGE INTRAVENOUS PRN
OUTPATIENT
Start: 2025-09-05

## 2025-09-02 RX ORDER — SODIUM CHLORIDE 0.9 % (FLUSH) 0.9 %
5-40 SYRINGE (ML) INJECTION PRN
OUTPATIENT
Start: 2025-09-05

## 2025-09-02 RX ORDER — DIPHENHYDRAMINE HYDROCHLORIDE 50 MG/ML
50 INJECTION, SOLUTION INTRAMUSCULAR; INTRAVENOUS
OUTPATIENT
Start: 2025-09-05

## 2025-09-02 RX ORDER — ALBUTEROL SULFATE 90 UG/1
4 INHALANT RESPIRATORY (INHALATION) PRN
OUTPATIENT
Start: 2025-09-05

## 2025-09-02 RX ORDER — SODIUM CHLORIDE 9 MG/ML
5-250 INJECTION, SOLUTION INTRAVENOUS PRN
Status: CANCELLED | OUTPATIENT
Start: 2025-09-05

## 2025-09-02 RX ORDER — ACETAMINOPHEN 325 MG/1
650 TABLET ORAL
OUTPATIENT
Start: 2025-09-05

## 2025-09-02 RX ORDER — SODIUM CHLORIDE 9 MG/ML
INJECTION, SOLUTION INTRAVENOUS PRN
OUTPATIENT
Start: 2025-09-05

## 2025-09-02 RX ORDER — ONDANSETRON 2 MG/ML
8 INJECTION INTRAMUSCULAR; INTRAVENOUS
OUTPATIENT
Start: 2025-09-05

## 2025-09-02 RX ORDER — SODIUM CHLORIDE 9 MG/ML
5-250 INJECTION, SOLUTION INTRAVENOUS PRN
Status: DISCONTINUED | OUTPATIENT
Start: 2025-09-02 | End: 2025-09-03 | Stop reason: HOSPADM

## 2025-09-02 RX ORDER — TIRZEPATIDE 5 MG/.5ML
5 INJECTION, SOLUTION SUBCUTANEOUS WEEKLY
COMMUNITY

## 2025-09-02 RX ORDER — EPINEPHRINE 1 MG/ML
0.3 INJECTION, SOLUTION INTRAMUSCULAR; SUBCUTANEOUS PRN
OUTPATIENT
Start: 2025-09-05

## 2025-09-02 RX ADMIN — FERRIC CARBOXYMALTOSE INJECTION 750 MG: 50 INJECTION, SOLUTION INTRAVENOUS at 15:13

## 2025-09-02 RX ADMIN — SODIUM CHLORIDE, PRESERVATIVE FREE 20 ML: 5 INJECTION INTRAVENOUS at 14:54

## 2025-09-02 RX ADMIN — SODIUM CHLORIDE 25 ML/HR: 9 INJECTION, SOLUTION INTRAVENOUS at 14:54

## 2025-09-02 ASSESSMENT — PAIN SCALES - GENERAL: PAINLEVEL_OUTOF10: 4

## 2025-09-02 ASSESSMENT — PAIN DESCRIPTION - LOCATION: LOCATION: HEAD;KNEE
